# Patient Record
Sex: FEMALE | HISPANIC OR LATINO | Employment: OTHER | URBAN - METROPOLITAN AREA
[De-identification: names, ages, dates, MRNs, and addresses within clinical notes are randomized per-mention and may not be internally consistent; named-entity substitution may affect disease eponyms.]

---

## 2017-12-29 ENCOUNTER — OFFICE VISIT (OUTPATIENT)
Dept: URGENT CARE | Facility: PHYSICIAN GROUP | Age: 62
End: 2017-12-29

## 2017-12-29 VITALS
OXYGEN SATURATION: 96 % | BODY MASS INDEX: 24.69 KG/M2 | SYSTOLIC BLOOD PRESSURE: 102 MMHG | DIASTOLIC BLOOD PRESSURE: 64 MMHG | WEIGHT: 135 LBS | RESPIRATION RATE: 20 BRPM | HEART RATE: 64 BPM | TEMPERATURE: 97.9 F

## 2017-12-29 DIAGNOSIS — R11.2 NON-INTRACTABLE VOMITING WITH NAUSEA, UNSPECIFIED VOMITING TYPE: ICD-10-CM

## 2017-12-29 DIAGNOSIS — J06.9 URI, ACUTE: Primary | ICD-10-CM

## 2017-12-29 DIAGNOSIS — K52.9 GASTROENTERITIS: ICD-10-CM

## 2017-12-29 LAB
APPEARANCE UR: CLEAR
BILIRUB UR STRIP-MCNC: NORMAL MG/DL
COLOR UR AUTO: NORMAL
GLUCOSE UR STRIP.AUTO-MCNC: NORMAL MG/DL
KETONES UR STRIP.AUTO-MCNC: NORMAL MG/DL
LEUKOCYTE ESTERASE UR QL STRIP.AUTO: NORMAL
NITRITE UR QL STRIP.AUTO: NORMAL
PH UR STRIP.AUTO: 5 [PH] (ref 5–8)
PROT UR QL STRIP: NORMAL MG/DL
RBC UR QL AUTO: NORMAL
SP GR UR STRIP.AUTO: 1
UROBILINOGEN UR STRIP-MCNC: NORMAL MG/DL

## 2017-12-29 PROCEDURE — 81002 URINALYSIS NONAUTO W/O SCOPE: CPT | Performed by: PHYSICIAN ASSISTANT

## 2017-12-29 PROCEDURE — 99214 OFFICE O/P EST MOD 30 MIN: CPT | Performed by: PHYSICIAN ASSISTANT

## 2017-12-29 RX ORDER — ONDANSETRON 4 MG/1
4 TABLET, ORALLY DISINTEGRATING ORAL ONCE
Status: COMPLETED | OUTPATIENT
Start: 2017-12-29 | End: 2017-12-29

## 2017-12-29 RX ADMIN — ONDANSETRON 4 MG: 4 TABLET, ORALLY DISINTEGRATING ORAL at 19:51

## 2017-12-30 RX ORDER — ONDANSETRON 4 MG/1
4 TABLET, ORALLY DISINTEGRATING ORAL EVERY 6 HOURS PRN
Qty: 10 TAB | Refills: 1 | Status: SHIPPED | OUTPATIENT
Start: 2017-12-30 | End: 2020-10-08

## 2017-12-30 NOTE — PROGRESS NOTES
Subjective:      Angely Nassar is a 62 y.o. female who presents with Cough (congestion, ear pain, abd pain, back pain,chills  x 5 days)    PMH:  has a past medical history of Active smoker (10/28/2014); Anxiety; Depression; Headache(784.0); HTN (hypertension) (10/7/2013); Hypertension; and Uterine cancer (CMS-HCC) (1994). She also has no past medical history of Breast cancer (CMS-HCC).  MEDS:   Current Outpatient Prescriptions:   •  Acetaminophen (TYLENOL PO), Take 1,000 mg by mouth., Disp: , Rfl:   •  Dextromethorphan Polistirex (ROBITUSSIN 12 HOUR COUGH PO), Take  by mouth., Disp: , Rfl:   •  amoxicillin-clavulanate (AUGMENTIN) 875-125 MG Tab, Take 1 Tab by mouth 2 times a day., Disp: 20 Tab, Rfl: 0  •  fluticasone (FLONASE) 50 MCG/ACT nasal spray, Spray 2 Sprays in nose every day., Disp: 16 g, Rfl: 1  •  clobetasol (TEMOVATE) 0.05 % Cream, Apply to rash on hands twice a day., Disp: 30 g, Rfl: 0  •  hydrocodone-acetaminophen (NORCO) 5-325 MG Tab per tablet, Take 1-2 Tabs by mouth every 6 hours as needed., Disp: 12 Tab, Rfl: 0  •  triamcinolone acetonide (KENALOG) 0.5 % Cream, bid, Disp: 45 g, Rfl: 1  •  clotrimazole-betamethasone (LOTRISONE) 1-0.05 % CREA, APPLY TO THE AFFECTED AREA 2 TIMES DAILY, Disp: 1 Tube, Rfl: 0  •  meloxicam (MOBIC) 15 MG tablet, Take 1 Tab by mouth every day., Disp: 30 Tab, Rfl: 3  •  fluticasone (FLONASE) 50 MCG/ACT nasal spray, Spray 2 Sprays in nose every day., Disp: 16 g, Rfl: 3  •  sumatriptan (IMITREX) 50 MG TABS, Take 1 Tab by mouth Once PRN for Migraine for up to 1 dose., Disp: 10 Tab, Rfl: 3  •  omeprazole (PRILOSEC) 20 MG CPDR, TAKE ONE (1) CAPSULE BY MOUTH ONCE DAILY., Disp: 180 Cap, Rfl: 1  ALLERGIES:   Allergies   Allergen Reactions   • Tylenol W/Codeine [Acetaminophen-Codeine] Anaphylaxis   • Lisinopril      Dryness, watery eyes, cough, difficulty breathing     SURGHX:   Past Surgical History:   Procedure Laterality Date   • CHOLECYSTECTOMY  2001     SOCHX:  reports that  she has been smoking.  She has a 0.90 pack-year smoking history. She has never used smokeless tobacco. She reports that she does not drink alcohol or use drugs.  FH: family history includes Cancer in her mother.          URI    This is a new problem. The current episode started in the past 7 days. The problem has been unchanged. There has been no fever. Associated symptoms include abdominal pain, congestion, coughing, diarrhea, ear pain, headaches, nausea, rhinorrhea, sneezing and vomiting. Treatments tried: otc cough cold medicine. The treatment provided no relief.       Review of Systems   HENT: Positive for congestion, ear pain, rhinorrhea and sneezing.    Respiratory: Positive for cough and sputum production.    Gastrointestinal: Positive for abdominal pain, diarrhea, nausea and vomiting.   Neurological: Positive for headaches.   All other systems reviewed and are negative.         Objective:     /64   Pulse 64   Temp 36.6 °C (97.9 °F)   Resp 20   Wt 61.2 kg (135 lb)   LMP 12/01/2006   SpO2 96%   BMI 24.69 kg/m²      Physical Exam   Constitutional: She is oriented to person, place, and time. She appears well-developed and well-nourished.   HENT:   Head: Normocephalic and atraumatic.   Right Ear: Tympanic membrane normal.   Left Ear: Tympanic membrane normal.   Nose: Mucosal edema and rhinorrhea present.   Mouth/Throat: Posterior oropharyngeal erythema present.   Eyes: Conjunctivae and EOM are normal. Pupils are equal, round, and reactive to light.   Neck: Normal range of motion. Neck supple.   Cardiovascular: Normal rate, regular rhythm and normal heart sounds.    Pulmonary/Chest: Effort normal. No respiratory distress. She has rhonchi.   Abdominal: Soft. Bowel sounds are normal. There is no tenderness.   Musculoskeletal: Normal range of motion.   Neurological: She is alert and oriented to person, place, and time. Gait normal.   Skin: Skin is warm and dry. Capillary refill takes less than 2 seconds.    Psychiatric: She has a normal mood and affect.   Nursing note and vitals reviewed.         UA: neg     Assessment/Plan:     1. URI, acute  ondansetron (ZOFRAN ODT) dispertab 4 mg    POCT Urinalysis   2. Non-intractable vomiting with nausea, unspecified vomiting type  ondansetron (ZOFRAN ODT) dispertab 4 mg    POCT Urinalysis    ondansetron (ZOFRAN ODT) 4 MG TABLET DISPERSIBLE   3. Gastroenteritis  ondansetron (ZOFRAN ODT) dispertab 4 mg    POCT Urinalysis   Discussed that I felt this was viral in nature. Did not see any evidence of a bacterial process. Discussed natural progression and sx care.    PT to follow clear diet for 8-12 hours, then progress to bland diet for 24 hours, then progress to regular diet as tolerated.       Discussed tobacco use, cessation counseling given.  PT is not ready to quit smoking at this time.     PT should follow up with PCP in 1-2 days for re-evaluation if symptoms have not improved.  Discussed red flags and reasons to return to UC or ED.  Pt and/or family verbalized understanding of diagnosis and follow up instructions and was offered informational handout on diagnosis.  PT discharged.

## 2017-12-30 NOTE — PATIENT INSTRUCTIONS
Gastroenteritis viral  (Viral Gastroenteritis)  La gastroenteritis viral también es conocida samina gripe del estómago. Vesna trastorno afecta el estómago y el tubo digestivo. Puede causar diarrea y vómitos repentinos. La enfermedad generalmente dura entre 3 y 8 días. La mayoría de las personas desarrolla litzy respuesta inmunológica. Con el tiempo, esto elimina el virus. Mientras se desarrolla esta respuesta natural, el virus puede afectar en forma importante mendez zackary.   CAUSAS  Muchos virus diferentes pueden causar gastroenteritis, por ejemplo el rotavirus o el norovirus. Estos virus pueden contagiarse al consumir alimentos o agua contaminados. También puede contagiarse al compartir utensilios u otros artículos personales con litzy persona infectada o al tocar litzy superficie contaminada.   SÍNTOMAS  Los síntomas más comunes son diarrea y vómitos. Estos problemas pueden causar litzy pérdida grave de líquidos corporales(deshidratación) y un desequilibrio de sales corporales(electrolitos). Otros síntomas pueden ser:   · Fiebre.  · Dolor de adali.  · Fatiga.  · Dolor abdominal.  DIAGNÓSTICO   El médico podrá hacer el diagnóstico de gastroenteritis viral basándose en los síntomas y el examen físico También pueden tomarle litzy muestra de materia fecal para diagnosticar la presencia de virus u otras infecciones.   TRATAMIENTO  Esta enfermedad generalmente desaparece sin tratamiento. Los tratamientos están dirigidos a la rehidratación. Los casos más graves de gastroenteritis viral implican vómitos tan intensos que no es posible retener líquidos. En estos casos, los líquidos deben administrarse a través de litzy vía intravenosa (IV).   INSTRUCCIONES PARA EL CUIDADO DOMICILIARIO  · Sruthi suficientes líquidos para mantener la orina no o de color amarillo pálido. Sruthi pequeñas cantidades de líquido con frecuencia y aumente la cantidad según la tolerancia.  · Pida instrucciones específicas a mendez médico con respecto a la  rehidratación.  · Evite:  ¨ Alimentos que tengan mucha azúcar.  ¨ Alcohol.  ¨ Gaseosas.  ¨ Tabaco.  ¨ Jugos.  ¨ Bebidas con cafeína.  ¨ Líquidos muy calientes o fríos.  ¨ Alimentos muy grasos.  ¨ Quentin demasiado a la vez.  ¨ Productos lácteos hasta 24 a 48 horas después de que se detenga la diarrea.  · Puede consumir probióticos. Los probióticos son cultivos activos de bacterias beneficiosas. Pueden disminuir la cantidad y el número de deposiciones diarreicas en el adulto. Se encuentran en los yogures con cultivos activos y en los suplementos.  · Lave bert arabella angelica para evitar que se disemine el virus.  · Sólo tome medicamentos de venta jonatan o recetados para calmar el dolor, las molestias o bajar la fiebre según las indicaciones de mendez médico. No administre aspirina a los niños. Los medicamentos antidiarreicos no son recomendables.  · Consulte a mendez médico si puede seguir tomando arabella medicamentos recetados o de venta jonatan.  · Cumpla con todas las visitas de control, según le indique mendez médico.  SOLICITE ATENCIÓN MÉDICA DE INMEDIATO SI:  · No puede retener líquidos.  · No hay emisión de orina laura 6 a 8 horas.  · Le falta el aire.  · Observa pablo en el vómito (se ve samina café molido) o en la materia fecal.  · Siente dolor abdominal que empeora o se concentra en litzy gunner pequeña (se localiza).  · Tiene náuseas o vómitos persistentes.  · Tiene fiebre.  · El paciente es un ava nik de 3 meses y tiene fiebre.  · El paciente es un ava mayor de 3 meses, tiene fiebre y síntomas persistentes.  · El paciente es un ava mayor de 3 meses y tiene fiebre y síntomas que empeoran repentinamente.  · El paciente es un bebé y no tiene lágrimas cuando llora.  ASEGÚRESE QUE:   · Comprende estas instrucciones.  · Controlará mendez enfermedad.  · Solicitará ayuda inmediatamente si no mejora o si empeora.     Esta información no tiene samina fin reemplazar el consejo del médico. Asegúrese de hacerle al médico cualquier pregunta que  "tenga.     Document Released: 12/18/2006 Document Revised: 03/11/2013  Elsevier Interactive Patient Education ©2016 [employer]      Información acerca de la gripe  (Influenza Facts)  La gripe es litzy enfermedad respiratoria contagiosa causada por el virus de la influenza. Puede causar litzy enfermedad moderada a severa. Mientras que la mayoría de las personas sanas se recupera de la gripe sin un tratamiento específico y sin complicaciones, los ancianos, los niños pequeños y los que sufren ciertas enfermedades tienen más riesgo de sufrir complicaciones graves, y aún la muerte.  CAUSAS  · El virus de la gripe se contagia de persona a persona por gotitas que se eliminan al toser o al estornudar.   · Litzy persona también puede infectarse al tocar un objeto o superficie contaminada con virus y luego llevarse la mano a la boca, los ojos o la nariz.   · Los adultos pueden infectar a otras personas desde el día anterior a que se produzcan los síntomas y hasta 7 días después de enfermarse. Por lo tanto, es posible que litzy persona contagie aún antes de saber que está enfermo y que siga contagiando a otras personas mientras esté enfermo.   SÍNTOMAS  · Fiebre (normalmente pauly).   · Dolor de adali.   · Cansancio (puede ser extremo).   · Tos.   · Dolor de garganta.   · Congestión nasal o que gotea.   · Birgit en el cuerpo.   · Puede presentar diarrea o vómitos, especialmente los niños.   · Estos síntomas se conocen samina \"síntomas característicos de la gripe\". Muchas enfermedades distintas, incluso el resfrío común, pueden tener síntomas similares.   DIAGNÓSTICO  · Existen pruebas disponibles que pueden determinar si usted tiene gripe, siempre que se realicen dentro de los primeros 2 ó 3 días después del comienzo de los síntomas.   · Un examen médico y análisis adicionales pueden ser necesarios para identificar si tiene litzy enfermedad que está complicando la gripe.   RIESGOS Y COMPLICACIONES  Algunos de las complicaciones que " ocasiona la gripe son:  · Neumonía bacteriana o neumonía progresiva causada por el virus de la gripe.   · Pérdida de líquidos corporales (deshidratación).   · Empeoramiento de enfermedades crónicas, tales samina la insuficiencia cardíaca, el asma, o la diabetes.   · Problemas en la cavidad nasal e infecciones del oído.   INSTRUCCIONES PARA EL CUIDADO DOMICILIARIO  · Solicite atención médica tempranamente.   · Si está en riesgo de sufrir complicaciones de la gripe, deberá consultar a un profesional de la zackary cuando comiencen los síntomas. Las personas que tienen un alto riesgo de complicaciones son:   · Mayores de 65 años de edad.   · Pacientes con enfermedades crónicas.   · Mujeres embarazadas.   · Niños pequeños.   · El profesional que lo asiste podrá recomendar la utilización de ciertas drogas antivirales para el tratamiento de la gripe.   · Si contrae gripe, es aconsejable que descanse lo suficiente, sonya gran cantidad de líquidos y evite el consumo de alcohol y tabaco.   · También puede sunni medicamentos de venta jonatan que alivien los síntomas de la gripe, si se lo permite el profesional que lo asiste. (Nunca de aspirina a niños o adolescentes que tienen síntomas de la gripe, particularmente fiebre).   PREVENCIÓN  La mejor forma y la más simple de prevenir la gripe es vacunándose cada otoño. Otras medidas que pueden ayudarlo a protegerse contra la gripe son:  · Medicamentos antivirales   · Se nazario aprobado algunos medicamentos antivirales para la prevención de esta enfermedad Son medicamentos prescriptos y deberá consultar al médico antes de utilizarlos.   · Hábitos para litzy buena zackary   · Cúbrase la boca y la nariz con un tisú cuando tosa o estornude, luego bote el tisú luego de usarlo.   · Lávese las angelica frecuentemente con agua y jabón, especialmente luego de toser o estornudar. Si no tiene agua cerca, use un limpiador de angelica con alcohol.   · Evite el contacto personas que estén enfermas.   · Si contrae  la gripe, no vaya al trabajo o escuela. Si está enfermo, no se acerque a otras personas para no contagiarlas.   · Evite tocarse los ojos, la nariz o la boca. Los gérmenes a menudo se contagian de esta forma.   SIGNOS DE EMERGENCIA QUE NECESITAN ATENCIÓN MÉDICA URGENTE EN NIÑOS:  · Respiración rápida o problemas para respirar.   · Color de piel azulado.   · No whitney suficiente cantidad de líquidos.   · No se despierta o no interactúa.   · Está tan irritable que no quiere que se lo cargue.   · Los síntomas mejoran teri luego vuelven con fiebre y la tos empeora.   · Fiebre con erupción cutánea.   SIGNOS DE EMERGENCIA QUE NECESITAN ATENCIÓN MÉDICA URGENTE EN ADULTOS:  · Le falta la respiración o presenta dificultades respiratorias.   · Dolor o presión en el pecho o abdomen.   · Mareos repentinos.   · Confusión.   · Vómitos intensos y persistentes.   SOLICITE ATENCIÓN MÉDICA DE INMEDIATO SI OBSERVA:  Usted o alguien que conoce tiene los síntomas descritos arriba. Cuando llegue al centro de emergencias, comunique en la recepción que mari tener gripe. Es posible que le pidan que utilice litzy máscara y/o ubicarse en un área aislada para evitar que otros se contagien.  ESTÉ SEGURO QUE:  · Comprende las instrucciones para el pauly médica.   · Controlará mendez enfermedad.   · Solicitará atención médica de inmediato según las indicaciones.   Document Released: 03/26/2009 Document Revised: 03/11/2013  [employer]® Patient Information ©2013 [employer].

## 2018-01-03 ASSESSMENT — ENCOUNTER SYMPTOMS
SPUTUM PRODUCTION: 1
VOMITING: 1
ABDOMINAL PAIN: 1
NAUSEA: 1
COUGH: 1
RHINORRHEA: 1
DIARRHEA: 1
HEADACHES: 1

## 2020-01-20 ENCOUNTER — HOSPITAL ENCOUNTER (EMERGENCY)
Facility: MEDICAL CENTER | Age: 65
End: 2020-01-21
Attending: EMERGENCY MEDICINE
Payer: MEDICARE

## 2020-01-20 ENCOUNTER — APPOINTMENT (OUTPATIENT)
Dept: RADIOLOGY | Facility: MEDICAL CENTER | Age: 65
End: 2020-01-20
Attending: EMERGENCY MEDICINE
Payer: MEDICARE

## 2020-01-20 DIAGNOSIS — G43.809 OTHER MIGRAINE WITHOUT STATUS MIGRAINOSUS, NOT INTRACTABLE: ICD-10-CM

## 2020-01-20 DIAGNOSIS — I10 HYPERTENSION, UNSPECIFIED TYPE: ICD-10-CM

## 2020-01-20 PROCEDURE — 99285 EMERGENCY DEPT VISIT HI MDM: CPT

## 2020-01-20 PROCEDURE — 93005 ELECTROCARDIOGRAM TRACING: CPT | Performed by: EMERGENCY MEDICINE

## 2020-01-20 RX ORDER — DIPHENHYDRAMINE HYDROCHLORIDE 50 MG/ML
25 INJECTION INTRAMUSCULAR; INTRAVENOUS ONCE
Status: COMPLETED | OUTPATIENT
Start: 2020-01-21 | End: 2020-01-21

## 2020-01-20 RX ORDER — METOCLOPRAMIDE HYDROCHLORIDE 5 MG/ML
10 INJECTION INTRAMUSCULAR; INTRAVENOUS ONCE
Status: COMPLETED | OUTPATIENT
Start: 2020-01-21 | End: 2020-01-21

## 2020-01-20 ASSESSMENT — ENCOUNTER SYMPTOMS: HEADACHES: 1

## 2020-01-20 ASSESSMENT — LIFESTYLE VARIABLES: DO YOU DRINK ALCOHOL: NO

## 2020-01-21 ENCOUNTER — APPOINTMENT (OUTPATIENT)
Dept: RADIOLOGY | Facility: MEDICAL CENTER | Age: 65
End: 2020-01-21
Attending: EMERGENCY MEDICINE
Payer: MEDICARE

## 2020-01-21 VITALS
DIASTOLIC BLOOD PRESSURE: 68 MMHG | RESPIRATION RATE: 16 BRPM | SYSTOLIC BLOOD PRESSURE: 129 MMHG | HEIGHT: 62 IN | BODY MASS INDEX: 24.87 KG/M2 | TEMPERATURE: 97.5 F | HEART RATE: 59 BPM | WEIGHT: 135.14 LBS | OXYGEN SATURATION: 97 %

## 2020-01-21 LAB
ALBUMIN SERPL BCP-MCNC: 4.3 G/DL (ref 3.2–4.9)
ALBUMIN/GLOB SERPL: 1.8 G/DL
ALP SERPL-CCNC: 76 U/L (ref 30–99)
ALT SERPL-CCNC: 24 U/L (ref 2–50)
ANION GAP SERPL CALC-SCNC: 10 MMOL/L (ref 0–11.9)
AST SERPL-CCNC: 26 U/L (ref 12–45)
BASOPHILS # BLD AUTO: 1.4 % (ref 0–1.8)
BASOPHILS # BLD: 0.12 K/UL (ref 0–0.12)
BILIRUB SERPL-MCNC: 0.4 MG/DL (ref 0.1–1.5)
BUN SERPL-MCNC: 14 MG/DL (ref 8–22)
CALCIUM SERPL-MCNC: 9.2 MG/DL (ref 8.5–10.5)
CHLORIDE SERPL-SCNC: 104 MMOL/L (ref 96–112)
CO2 SERPL-SCNC: 24 MMOL/L (ref 20–33)
CREAT SERPL-MCNC: 0.89 MG/DL (ref 0.5–1.4)
EKG IMPRESSION: NORMAL
EOSINOPHIL # BLD AUTO: 0.26 K/UL (ref 0–0.51)
EOSINOPHIL NFR BLD: 3 % (ref 0–6.9)
ERYTHROCYTE [DISTWIDTH] IN BLOOD BY AUTOMATED COUNT: 41.5 FL (ref 35.9–50)
GLOBULIN SER CALC-MCNC: 2.4 G/DL (ref 1.9–3.5)
GLUCOSE SERPL-MCNC: 154 MG/DL (ref 65–99)
HCT VFR BLD AUTO: 46.4 % (ref 37–47)
HGB BLD-MCNC: 15.7 G/DL (ref 12–16)
IMM GRANULOCYTES # BLD AUTO: 0.03 K/UL (ref 0–0.11)
IMM GRANULOCYTES NFR BLD AUTO: 0.4 % (ref 0–0.9)
LYMPHOCYTES # BLD AUTO: 2.97 K/UL (ref 1–4.8)
LYMPHOCYTES NFR BLD: 34.7 % (ref 22–41)
MCH RBC QN AUTO: 31.1 PG (ref 27–33)
MCHC RBC AUTO-ENTMCNC: 33.8 G/DL (ref 33.6–35)
MCV RBC AUTO: 91.9 FL (ref 81.4–97.8)
MONOCYTES # BLD AUTO: 0.34 K/UL (ref 0–0.85)
MONOCYTES NFR BLD AUTO: 4 % (ref 0–13.4)
NEUTROPHILS # BLD AUTO: 4.85 K/UL (ref 2–7.15)
NEUTROPHILS NFR BLD: 56.5 % (ref 44–72)
NRBC # BLD AUTO: 0 K/UL
NRBC BLD-RTO: 0 /100 WBC
PLATELET # BLD AUTO: 256 K/UL (ref 164–446)
PMV BLD AUTO: 12 FL (ref 9–12.9)
POTASSIUM SERPL-SCNC: 3.4 MMOL/L (ref 3.6–5.5)
PROT SERPL-MCNC: 6.7 G/DL (ref 6–8.2)
RBC # BLD AUTO: 5.05 M/UL (ref 4.2–5.4)
SODIUM SERPL-SCNC: 138 MMOL/L (ref 135–145)
TROPONIN T SERPL-MCNC: 6 NG/L (ref 6–19)
WBC # BLD AUTO: 8.6 K/UL (ref 4.8–10.8)

## 2020-01-21 PROCEDURE — 80053 COMPREHEN METABOLIC PANEL: CPT

## 2020-01-21 PROCEDURE — 96375 TX/PRO/DX INJ NEW DRUG ADDON: CPT

## 2020-01-21 PROCEDURE — 96374 THER/PROPH/DIAG INJ IV PUSH: CPT

## 2020-01-21 PROCEDURE — 85025 COMPLETE CBC W/AUTO DIFF WBC: CPT

## 2020-01-21 PROCEDURE — 71045 X-RAY EXAM CHEST 1 VIEW: CPT

## 2020-01-21 PROCEDURE — 70450 CT HEAD/BRAIN W/O DYE: CPT

## 2020-01-21 PROCEDURE — 84484 ASSAY OF TROPONIN QUANT: CPT

## 2020-01-21 PROCEDURE — 700111 HCHG RX REV CODE 636 W/ 250 OVERRIDE (IP): Performed by: EMERGENCY MEDICINE

## 2020-01-21 RX ORDER — NAPROXEN 500 MG/1
500 TABLET ORAL 2 TIMES DAILY WITH MEALS
Qty: 30 TAB | Refills: 0 | Status: SHIPPED | OUTPATIENT
Start: 2020-01-21 | End: 2020-10-08

## 2020-01-21 RX ADMIN — METOCLOPRAMIDE 10 MG: 5 INJECTION, SOLUTION INTRAMUSCULAR; INTRAVENOUS at 00:46

## 2020-01-21 RX ADMIN — DIPHENHYDRAMINE HYDROCHLORIDE 25 MG: 50 INJECTION INTRAMUSCULAR; INTRAVENOUS at 00:46

## 2020-01-21 ASSESSMENT — ENCOUNTER SYMPTOMS
FEVER: 0
VOMITING: 0
MYALGIAS: 0
NAUSEA: 0
ABDOMINAL PAIN: 0
DIZZINESS: 0

## 2020-01-21 NOTE — ED PROVIDER NOTES
ED Provider Note    Scribed for Dede Jason M.D. by Tesha Gentile. 1/20/2020, 11:40 PM.    Means of arrival: Walk  In  History obtained from: Patient   History limited by: None    CHIEF COMPLAINT  Chief Complaint   Patient presents with   • Headache     back of head    • Hypertension     no hx of same per pt       HPI  Angely Nassar is a 64 y.o. female who presents to the Emergency Department for evaluation of headache for the past 3 weeks. At her daughter's request, the patient came to the ED tonight because her headache has been worsening. She states that the pain is localized at the back of her head, radiating to the front, is moderate and throbbing.  She denies visual disturbances, nausea, vomiting.  Of note patient was noted to be hypertensive in triage, the patient notes that she does not have a history of hypertension and notes that her blood pressure is usually on the lower side. The patient notes that she took a Xanax before coming to the ED today as she was feeling anxious about her headache. The patient additionally endorses allover joint pain that has been going on for many months.  Denies any trauma.  Denies numbness, tingling, or weakness.      REVIEW OF SYSTEMS  Review of Systems   Constitutional: Negative for fever.   Cardiovascular: Negative for chest pain.        Hypertension   Gastrointestinal: Negative for abdominal pain, nausea and vomiting.   Musculoskeletal: Positive for joint pain. Negative for myalgias.   Neurological: Positive for headaches. Negative for dizziness.   All other systems reviewed and are negative.      PAST MEDICAL HISTORY   has a past medical history of Active smoker (10/28/2014), Anxiety, Depression, Headache(784.0), HTN (hypertension) (10/7/2013), Hypertension, and Uterine cancer (HCC) (1994).    SURGICAL HISTORY   has a past surgical history that includes cholecystectomy (2001).    SOCIAL HISTORY  Social History     Tobacco Use   • Smoking status:  Current Every Day Smoker     Packs/day: 0.30     Years: 3.00     Pack years: 0.90   • Smokeless tobacco: Never Used   Substance Use Topics   • Alcohol use: No   • Drug use: No      Social History     Substance and Sexual Activity   Drug Use No       FAMILY HISTORY  Family History   Problem Relation Age of Onset   • Cancer Mother         uterine cancer       CURRENT MEDICATIONS  Current Outpatient Medications:   •  ondansetron (ZOFRAN ODT) 4 MG TABLET DISPERSIBLE, Take 1 Tab by mouth every 6 hours as needed for Nausea., Disp: 10 Tab, Rfl: 1  •  Acetaminophen (TYLENOL PO), Take 1,000 mg by mouth., Disp: , Rfl:   •  Dextromethorphan Polistirex (ROBITUSSIN 12 HOUR COUGH PO), Take  by mouth., Disp: , Rfl:   •  amoxicillin-clavulanate (AUGMENTIN) 875-125 MG Tab, Take 1 Tab by mouth 2 times a day., Disp: 20 Tab, Rfl: 0  •  fluticasone (FLONASE) 50 MCG/ACT nasal spray, Spray 2 Sprays in nose every day., Disp: 16 g, Rfl: 1  •  clobetasol (TEMOVATE) 0.05 % Cream, Apply to rash on hands twice a day., Disp: 30 g, Rfl: 0  •  hydrocodone-acetaminophen (NORCO) 5-325 MG Tab per tablet, Take 1-2 Tabs by mouth every 6 hours as needed., Disp: 12 Tab, Rfl: 0  •  triamcinolone acetonide (KENALOG) 0.5 % Cream, bid, Disp: 45 g, Rfl: 1  •  clotrimazole-betamethasone (LOTRISONE) 1-0.05 % CREA, APPLY TO THE AFFECTED AREA 2 TIMES DAILY, Disp: 1 Tube, Rfl: 0  •  meloxicam (MOBIC) 15 MG tablet, Take 1 Tab by mouth every day., Disp: 30 Tab, Rfl: 3  •  fluticasone (FLONASE) 50 MCG/ACT nasal spray, Spray 2 Sprays in nose every day., Disp: 16 g, Rfl: 3  •  sumatriptan (IMITREX) 50 MG TABS, Take 1 Tab by mouth Once PRN for Migraine for up to 1 dose., Disp: 10 Tab, Rfl: 3  •  omeprazole (PRILOSEC) 20 MG CPDR, TAKE ONE (1) CAPSULE BY MOUTH ONCE DAILY., Disp: 180 Cap, Rfl: 1      ALLERGIES  Allergies   Allergen Reactions   • Tylenol W/Codeine [Acetaminophen-Codeine] Anaphylaxis   • Lisinopril      Dryness, watery eyes, cough, difficulty breathing  "      PHYSICAL EXAM  VITAL SIGNS: BP (!) 180/93   Pulse 81   Temp 36.4 °C (97.5 °F) (Oral)   Resp 17   Ht 1.575 m (5' 2\")   Wt 61.3 kg (135 lb 2.3 oz)   LMP 12/01/2006   SpO2 96%   BMI 24.72 kg/m²   Vitals reviewed by myself.  Physical Exam  Nursing note and vitals reviewed.   Constitutional: Well-developed and well-nourished. No distress.   HENT: Head is normocephalic and atraumatic. Oropharynx is clear and moist without exudate or erythema.   Eyes: Pupils are equal, round, and reactive to light. No horizontal or vertical nystagmus. Conjunctiva are normal.   Cardiovascular: Normal rate and regular rhythm. No murmur heard. Normal radial pulses.  Pulmonary/Chest: Breath sounds normal. No wheezes or rales.   Abdominal: Soft and non-tender. No distention.    Musculoskeletal: Extremities exhibit normal range of motion without edema or tenderness. Patient ambulates with a normal narrow-based steady gait.   Neurological: Awake, alert and oriented to person, place, and time. No focal deficits noted. Cranial nerves II - XII intact. No pronator drift.  No dysmetria on cerebellar testing. Normal speech and language. Normal strength and sensation in bilateral upper and lower extremities.   Skin: Skin is warm and dry. No rash.   Psychiatric: Normal mood and affect. Appropriate for clinical situation.      DIAGNOSTIC STUDIES /  LABS  Labs Reviewed   COMP METABOLIC PANEL - Abnormal; Notable for the following components:       Result Value    Potassium 3.4 (*)     Glucose 154 (*)     All other components within normal limits   CBC WITH DIFFERENTIAL   TROPONIN   ESTIMATED GFR       All labs reviewed by me.    EKG Interpretation:  Interpreted by myself    12 Lead EKG interpreted by me to show:  EKG at 12:18 AM: Normal sinus rhythm, heart rate 67, normal axis,,  with nonspecific intraventricular conduction delay, QTc 448, no acute ST-T segment changes, left ventricular hypertrophy is present, no evidence of acute " arrhythmia or ischemia  My impression of this EKG: Does not indicate ischemia or arrhythmia at this time.    RADIOLOGY  CT-HEAD W/O   Final Result      No acute intracranial abnormality.      DX-CHEST-PORTABLE (1 VIEW)   Final Result      No acute cardiopulmonary abnormality.        The radiologist's interpretation of all radiological studies have been reviewed by me.    REASSESSMENT  11:40 PM - Patient was evaluated at bedside. Ordered EKG, CT-Head, DX-Chest, CBC with differential, CMP, and troponin to evaluate.     2:00 AM - Patient was reevaluated at bedside. Discussed lab and radiology results.     COURSE & MEDICAL DECISION MAKING  Nursing notes, VS, PMSFHx reviewed in chart.    Patient is a 64-year-old female who comes in for evaluation of headache and hypertension.  Differential diagnosis includes essential hypertension, hypertensive urgency, intracranial hemorrhage, electrolyte disturbance, acute kidney injury, acute coronary syndrome.  Diagnostic work-up includes labs, EKG and head CT.    Patient's initial vitals are notable for hypertension, at the time of my assessment patient's hypertension has resolved without intervention.  I had a long discussion with patient about hypertension and the need to keep a blood pressure diary and follow-up with PCP.  Given that her blood pressure has trended down to 130 systolic I would not initiate antihypertensive therapy from the emergency department.  Patient understands and is agreeable to this.  EKG returns and demonstrates no evidence of acute arrhythmia or ischemia.  Labs returned and are unremarkable.  Troponin is within normal limits.  CT of the head demonstrates no acute abnormalities.  Patient's headache was treated with Reglan and Benadryl after which she feels greatly improved.  Therefore patient is reassured.  Patient is then given strict return precautions and discharged in stable condition.    The patient will return for new or worsening symptoms and is  stable at the time of discharge.    The patient is referred to a primary physician for blood pressure management, diabetic screening, and for all other preventative health concerns.    DISPOSITION:  Patient will be discharged home in stable condition.    FOLLOW UP:  Primary Care Physician            OUTPATIENT MEDICATIONS:  Discharge Medication List as of 1/21/2020  2:09 AM      START taking these medications    Details   naproxen (NAPROSYN) 500 MG Tab Take 1 Tab by mouth 2 times a day, with meals., Disp-30 Tab, R-0, Print Rx Paper               FINAL IMPRESSION  1. Other migraine without status migrainosus, not intractable    2. Hypertension, unspecified type         I, Tesha Gentile (Kine), am scribing for, and in the presence of, Dede Jason M.D..    Electronically signed by: Tesha Gentile (Carmencitaibe), 1/20/2020    IDede M.D. personally performed the services described in this documentation, as scribed by Tesha Gentile in my presence, and it is both accurate and complete.    C.     The note accurately reflects work and decisions made by me.  Dede Jason M.D.  1/21/2020  4:00 AM

## 2020-01-21 NOTE — ED TRIAGE NOTES
"Chief Complaint   Patient presents with   • Headache     back of head    • Hypertension     no hx of same per pt      Pt presents to ed for 7/10 headache x3 weeks.  Pt denies n/v, vision changes.  Pt is hypertensive, 180/93 and reports that she does not take medications for this.    Pt is also concerned about ongoing elbow and knee joint pain x3 months.      Pt placed back in lobby.  Informed of triage process and wait times, thanked for patience.  Pt instructed to notify staff of any symptom changes.     BP (!) 180/93   Pulse 81   Temp 36.4 °C (97.5 °F) (Oral)   Resp 17   Ht 1.575 m (5' 2\")   Wt 61.3 kg (135 lb 2.3 oz)   LMP 12/01/2006   SpO2 96%   BMI 24.72 kg/m²    "

## 2020-10-08 ENCOUNTER — HOSPITAL ENCOUNTER (EMERGENCY)
Facility: MEDICAL CENTER | Age: 65
End: 2020-10-09
Attending: EMERGENCY MEDICINE
Payer: MEDICARE

## 2020-10-08 DIAGNOSIS — R51.9 ACUTE NONINTRACTABLE HEADACHE, UNSPECIFIED HEADACHE TYPE: ICD-10-CM

## 2020-10-08 DIAGNOSIS — R11.0 NAUSEA: ICD-10-CM

## 2020-10-08 DIAGNOSIS — Z56.6 STRESS AT WORK: ICD-10-CM

## 2020-10-08 DIAGNOSIS — E86.0 DEHYDRATION: ICD-10-CM

## 2020-10-08 PROCEDURE — 99284 EMERGENCY DEPT VISIT MOD MDM: CPT

## 2020-10-08 PROCEDURE — 93005 ELECTROCARDIOGRAM TRACING: CPT | Performed by: EMERGENCY MEDICINE

## 2020-10-08 PROCEDURE — 93005 ELECTROCARDIOGRAM TRACING: CPT

## 2020-10-08 RX ORDER — SODIUM CHLORIDE 9 MG/ML
1000 INJECTION, SOLUTION INTRAVENOUS ONCE
Status: COMPLETED | OUTPATIENT
Start: 2020-10-09 | End: 2020-10-09

## 2020-10-08 RX ORDER — METOCLOPRAMIDE HYDROCHLORIDE 5 MG/ML
10 INJECTION INTRAMUSCULAR; INTRAVENOUS ONCE
Status: COMPLETED | OUTPATIENT
Start: 2020-10-09 | End: 2020-10-09

## 2020-10-08 RX ORDER — DIPHENHYDRAMINE HCL 25 MG
25 TABLET ORAL ONCE
Status: COMPLETED | OUTPATIENT
Start: 2020-10-09 | End: 2020-10-09

## 2020-10-08 RX ORDER — CHLORTHALIDONE 25 MG/1
12.5 TABLET ORAL DAILY
Status: SHIPPED | COMMUNITY
End: 2021-10-18

## 2020-10-08 RX ORDER — LOSARTAN POTASSIUM 50 MG/1
50 TABLET ORAL DAILY
Status: SHIPPED | COMMUNITY
End: 2022-02-04 | Stop reason: SDUPTHER

## 2020-10-08 RX ORDER — ATORVASTATIN CALCIUM 20 MG/1
20 TABLET, FILM COATED ORAL NIGHTLY
Status: SHIPPED | COMMUNITY
End: 2022-02-04 | Stop reason: SDUPTHER

## 2020-10-08 RX ORDER — DULOXETIN HYDROCHLORIDE 20 MG/1
20 CAPSULE, DELAYED RELEASE ORAL DAILY
Status: SHIPPED | COMMUNITY
End: 2021-10-18

## 2020-10-08 RX ORDER — IBUPROFEN 800 MG/1
800 TABLET ORAL EVERY 8 HOURS PRN
Status: SHIPPED | COMMUNITY
End: 2022-02-04

## 2020-10-08 SDOH — HEALTH STABILITY - MENTAL HEALTH: OTHER PHYSICAL AND MENTAL STRAIN RELATED TO WORK: Z56.6

## 2020-10-08 ASSESSMENT — FIBROSIS 4 INDEX: FIB4 SCORE: 1.35

## 2020-10-09 ENCOUNTER — APPOINTMENT (OUTPATIENT)
Dept: RADIOLOGY | Facility: MEDICAL CENTER | Age: 65
End: 2020-10-09
Attending: EMERGENCY MEDICINE
Payer: MEDICARE

## 2020-10-09 VITALS
HEART RATE: 55 BPM | TEMPERATURE: 97.4 F | OXYGEN SATURATION: 94 % | WEIGHT: 136.47 LBS | RESPIRATION RATE: 10 BRPM | BODY MASS INDEX: 25.11 KG/M2 | HEIGHT: 62 IN | SYSTOLIC BLOOD PRESSURE: 140 MMHG | DIASTOLIC BLOOD PRESSURE: 79 MMHG

## 2020-10-09 LAB
ALBUMIN SERPL BCP-MCNC: 4 G/DL (ref 3.2–4.9)
ALBUMIN/GLOB SERPL: 1.6 G/DL
ALP SERPL-CCNC: 71 U/L (ref 30–99)
ALT SERPL-CCNC: 22 U/L (ref 2–50)
ANION GAP SERPL CALC-SCNC: 12 MMOL/L (ref 7–16)
APPEARANCE UR: CLEAR
APTT PPP: 30.1 SEC (ref 24.7–36)
AST SERPL-CCNC: 18 U/L (ref 12–45)
BASOPHILS # BLD AUTO: 0.9 % (ref 0–1.8)
BASOPHILS # BLD: 0.07 K/UL (ref 0–0.12)
BILIRUB SERPL-MCNC: 0.5 MG/DL (ref 0.1–1.5)
BILIRUB UR QL STRIP.AUTO: NEGATIVE
BUN SERPL-MCNC: 12 MG/DL (ref 8–22)
CALCIUM SERPL-MCNC: 8.9 MG/DL (ref 8.4–10.2)
CHLORIDE SERPL-SCNC: 102 MMOL/L (ref 96–112)
CO2 SERPL-SCNC: 26 MMOL/L (ref 20–33)
COLOR UR: YELLOW
CREAT SERPL-MCNC: 0.72 MG/DL (ref 0.5–1.4)
EKG IMPRESSION: NORMAL
EOSINOPHIL # BLD AUTO: 0.14 K/UL (ref 0–0.51)
EOSINOPHIL NFR BLD: 1.7 % (ref 0–6.9)
EPI CELLS #/AREA URNS HPF: NORMAL /HPF
ERYTHROCYTE [DISTWIDTH] IN BLOOD BY AUTOMATED COUNT: 39.5 FL (ref 35.9–50)
ERYTHROCYTE [SEDIMENTATION RATE] IN BLOOD BY WESTERGREN METHOD: 5 MM/HOUR (ref 0–30)
GLOBULIN SER CALC-MCNC: 2.5 G/DL (ref 1.9–3.5)
GLUCOSE SERPL-MCNC: 120 MG/DL (ref 65–99)
GLUCOSE UR STRIP.AUTO-MCNC: NEGATIVE MG/DL
HCT VFR BLD AUTO: 40.5 % (ref 37–47)
HGB BLD-MCNC: 14.1 G/DL (ref 12–16)
IMM GRANULOCYTES # BLD AUTO: 0.03 K/UL (ref 0–0.11)
IMM GRANULOCYTES NFR BLD AUTO: 0.4 % (ref 0–0.9)
INR PPP: 0.89 (ref 0.87–1.13)
KETONES UR STRIP.AUTO-MCNC: NEGATIVE MG/DL
LEUKOCYTE ESTERASE UR QL STRIP.AUTO: ABNORMAL
LIPASE SERPL-CCNC: 15 U/L (ref 7–58)
LYMPHOCYTES # BLD AUTO: 2.16 K/UL (ref 1–4.8)
LYMPHOCYTES NFR BLD: 26.9 % (ref 22–41)
MAGNESIUM SERPL-MCNC: 2.1 MG/DL (ref 1.5–2.5)
MCH RBC QN AUTO: 31.3 PG (ref 27–33)
MCHC RBC AUTO-ENTMCNC: 34.8 G/DL (ref 33.6–35)
MCV RBC AUTO: 90 FL (ref 81.4–97.8)
MICRO URNS: ABNORMAL
MONOCYTES # BLD AUTO: 0.45 K/UL (ref 0–0.85)
MONOCYTES NFR BLD AUTO: 5.6 % (ref 0–13.4)
MUCOUS THREADS #/AREA URNS HPF: NORMAL /HPF
NEUTROPHILS # BLD AUTO: 5.17 K/UL (ref 2–7.15)
NEUTROPHILS NFR BLD: 64.5 % (ref 44–72)
NITRITE UR QL STRIP.AUTO: NEGATIVE
NRBC # BLD AUTO: 0 K/UL
NRBC BLD-RTO: 0 /100 WBC
PH UR STRIP.AUTO: 6 [PH] (ref 5–8)
PLATELET # BLD AUTO: 210 K/UL (ref 164–446)
PMV BLD AUTO: 11.6 FL (ref 9–12.9)
POTASSIUM SERPL-SCNC: 3.5 MMOL/L (ref 3.6–5.5)
PROT SERPL-MCNC: 6.5 G/DL (ref 6–8.2)
PROT UR QL STRIP: NEGATIVE MG/DL
PROTHROMBIN TIME: 11.8 SEC (ref 12–14.6)
RBC # BLD AUTO: 4.5 M/UL (ref 4.2–5.4)
RBC UR QL AUTO: NEGATIVE
SODIUM SERPL-SCNC: 140 MMOL/L (ref 135–145)
SP GR UR STRIP.AUTO: <=1.005
T4 FREE SERPL-MCNC: 1.39 NG/DL (ref 0.93–1.7)
TROPONIN T SERPL-MCNC: <6 NG/L (ref 6–19)
TSH SERPL DL<=0.005 MIU/L-ACNC: 4 UIU/ML (ref 0.38–5.33)
WBC # BLD AUTO: 8 K/UL (ref 4.8–10.8)
WBC #/AREA URNS HPF: NORMAL /HPF

## 2020-10-09 PROCEDURE — A9270 NON-COVERED ITEM OR SERVICE: HCPCS | Performed by: EMERGENCY MEDICINE

## 2020-10-09 PROCEDURE — 700105 HCHG RX REV CODE 258: Performed by: EMERGENCY MEDICINE

## 2020-10-09 PROCEDURE — 85730 THROMBOPLASTIN TIME PARTIAL: CPT

## 2020-10-09 PROCEDURE — 83690 ASSAY OF LIPASE: CPT

## 2020-10-09 PROCEDURE — 70496 CT ANGIOGRAPHY HEAD: CPT

## 2020-10-09 PROCEDURE — 85652 RBC SED RATE AUTOMATED: CPT

## 2020-10-09 PROCEDURE — 700117 HCHG RX CONTRAST REV CODE 255: Performed by: EMERGENCY MEDICINE

## 2020-10-09 PROCEDURE — 84484 ASSAY OF TROPONIN QUANT: CPT

## 2020-10-09 PROCEDURE — 83735 ASSAY OF MAGNESIUM: CPT

## 2020-10-09 PROCEDURE — 84439 ASSAY OF FREE THYROXINE: CPT

## 2020-10-09 PROCEDURE — 96374 THER/PROPH/DIAG INJ IV PUSH: CPT | Mod: XU

## 2020-10-09 PROCEDURE — 85025 COMPLETE CBC W/AUTO DIFF WBC: CPT

## 2020-10-09 PROCEDURE — 85610 PROTHROMBIN TIME: CPT

## 2020-10-09 PROCEDURE — 700111 HCHG RX REV CODE 636 W/ 250 OVERRIDE (IP): Performed by: EMERGENCY MEDICINE

## 2020-10-09 PROCEDURE — 700102 HCHG RX REV CODE 250 W/ 637 OVERRIDE(OP): Performed by: EMERGENCY MEDICINE

## 2020-10-09 PROCEDURE — 80053 COMPREHEN METABOLIC PANEL: CPT

## 2020-10-09 PROCEDURE — 84443 ASSAY THYROID STIM HORMONE: CPT

## 2020-10-09 PROCEDURE — 81001 URINALYSIS AUTO W/SCOPE: CPT

## 2020-10-09 RX ADMIN — METOCLOPRAMIDE 10 MG: 5 INJECTION, SOLUTION INTRAMUSCULAR; INTRAVENOUS at 00:08

## 2020-10-09 RX ADMIN — SODIUM CHLORIDE 1000 ML: 9 INJECTION, SOLUTION INTRAVENOUS at 00:09

## 2020-10-09 RX ADMIN — DIPHENHYDRAMINE HCL 25 MG: 25 TABLET ORAL at 00:08

## 2020-10-09 RX ADMIN — IOHEXOL 100 ML: 350 INJECTION, SOLUTION INTRAVENOUS at 01:28

## 2020-10-09 ASSESSMENT — LIFESTYLE VARIABLES: DO YOU DRINK ALCOHOL: NO

## 2020-10-09 NOTE — ED PROVIDER NOTES
ED Provider Note    CHIEF COMPLAINT  Chief Complaint   Patient presents with   • Nausea     x 3 weeks   • Generalized Body Aches     x 3 weeks   • Blurred Vision     x 3 weeks   • Back Pain     upper x 3 weeks   • Dizziness     x 3 weeks       HPI    Primary care provider: Not on file  Means of arrival: POV  History obtained from: Patient, daughter  History limited by: Nothing    Angelyai Nassar is a 64 y.o. female who presents with numerous complaints.  Namely a headache for the last 3 weeks.  Hurts most days.  Mostly left-sided sharp sometimes goes to the back of her head.  She did have a fall 2 months ago striking the right side of her head.  She did not seek care at that time.  Did not lose consciousness.  No blood thinners.  Long history of migraine sometimes takes sumatriptan and it has not offered much relief lately.  She also complains of intermittent blurred vision generalized body aches nausea and fatigue.  She also gets lightheadedness sometimes.  The pain is mostly to the left side of her head and sharp, worsened by palpation.  No monocular vision changes she feels just diffuse blurry vision sometimes especially when her pain is worse.  No aggravating factors noted.  This does feel like her migraines but they usually never last this long and usually never this severe.  No thunderclap onset.  No weakness or sensory changes.  No chest or abdominal pain or vomiting today.  No fevers or difficulty breathing or known COVID contact.  She does have increased rest at home due to working too hard lately, no thoughts of self-harm.    REVIEW OF SYSTEMS  Constitutional: Negative for fever or chills.  Positive for malaise.  HENT: Negative for rhinorrhea or sore throat.  Positive for headache.  Eyes: Positive for occasional blurry vision no loss of vision or double vision.  Respiratory: Negative for cough or shortness of breath.    Cardiovascular: Negative for chest pain or palpitations.   Gastrointestinal:  Positive for occasional nausea, no vomiting or abdominal pain.  Genitourinary: Negative for dysuria or flank pain.   Musculoskeletal: Negative for back pain or joint pain.  Positive for occasional body aches.  Skin: Negative for itching or rash.   Neurological: Negative for sensory or motor changes.   Psychiatric/Behavioral: Positive for increased stress no suicidal thoughts.  See HPI for further details. All other systems are negative.     PAST MEDICAL HISTORY   has a past medical history of Active smoker (10/28/2014), Anxiety, Depression, Headache(784.0), HTN (hypertension) (10/7/2013), Hypertension, and Uterine cancer (HCC) (1994).    PAST FAMILY HISTORY  Family History   Problem Relation Age of Onset   • Cancer Mother         uterine cancer       SOCIAL HISTORY  Social History     Tobacco Use   • Smoking status: Current Every Day Smoker     Packs/day: 0.30     Years: 3.00     Pack years: 0.90   • Smokeless tobacco: Never Used   Substance and Sexual Activity   • Alcohol use: No   • Drug use: No   • Sexual activity: Never       SURGICAL HISTORY   has a past surgical history that includes cholecystectomy (2001).    CURRENT MEDICATIONS  Home Medications     Reviewed by Lorri Bello R.N. (Registered Nurse) on 10/08/20 at 2235  Med List Status: Partial   Medication Last Dose Status   Acetaminophen (TYLENOL PO) not taking Active   atorvastatin (LIPITOR) 20 MG Tab 10/7/2020 Active   chlorthalidone (HYGROTON) 25 MG Tab 10/7/2020 Active   DULoxetine (CYMBALTA) 20 MG Cap DR Particles past week Active   hydrocodone-acetaminophen (NORCO) 5-325 MG Tab per tablet not taking Active   ibuprofen (MOTRIN) 800 MG Tab 10/7/2020 Active   losartan (COZAAR) 50 MG Tab 10/7/2020 Active   sumatriptan (IMITREX) 50 MG TABS 10/8/2020 Active                ALLERGIES  Allergies   Allergen Reactions   • Tylenol W/Codeine [Acetaminophen-Codeine] Anaphylaxis   • Lisinopril      Dryness, watery eyes, cough, difficulty breathing       PHYSICAL  "EXAM  VITAL SIGNS: /79   Pulse (!) 55   Temp 36.3 °C (97.4 °F) (Temporal)   Resp (!) 10   Ht 1.575 m (5' 2\")   Wt 61.9 kg (136 lb 7.4 oz)   LMP 12/01/2006   SpO2 94%   Breastfeeding No   BMI 24.96 kg/m²    Pulse ox interpretation: On room air, I interpret this pulse ox as normal.  Constitutional: Chronically ill-appearing sitting up in mild distress.  HEENT: Normocephalic, atraumatic. Posterior pharynx clear, mucous membranes dry.  Eyes:  EOMI. Normal sclerae.  Visual fields full, PERRLA 3-2.  Neck: Supple, nontender.  Chest/Pulmonary: Clear to ausculation bilaterally, no wheezes or rhonchi.  Cardiovascular: Regular rate and rhythm, no murmur.   Abdomen: Soft, nontender; no rebound, guarding, or masses.  Back: No CVA or midline tenderness.   Musculoskeletal: No deformity or edema.  Neuro: Clear speech, normal coordination, cranial nerves II-XII grossly intact, no focal asymmetry or sensory deficits.   Psych: Anxious appearing but pleasant and cooperative.  Skin: No rashes, warm and dry.      DIAGNOSTIC STUDIES / PROCEDURES    LABS & EKG  Results for orders placed or performed during the hospital encounter of 10/08/20   CBC WITH DIFFERENTIAL   Result Value Ref Range    WBC 8.0 4.8 - 10.8 K/uL    RBC 4.50 4.20 - 5.40 M/uL    Hemoglobin 14.1 12.0 - 16.0 g/dL    Hematocrit 40.5 37.0 - 47.0 %    MCV 90.0 81.4 - 97.8 fL    MCH 31.3 27.0 - 33.0 pg    MCHC 34.8 33.6 - 35.0 g/dL    RDW 39.5 35.9 - 50.0 fL    Platelet Count 210 164 - 446 K/uL    MPV 11.6 9.0 - 12.9 fL    Neutrophils-Polys 64.50 44.00 - 72.00 %    Lymphocytes 26.90 22.00 - 41.00 %    Monocytes 5.60 0.00 - 13.40 %    Eosinophils 1.70 0.00 - 6.90 %    Basophils 0.90 0.00 - 1.80 %    Immature Granulocytes 0.40 0.00 - 0.90 %    Nucleated RBC 0.00 /100 WBC    Neutrophils (Absolute) 5.17 2.00 - 7.15 K/uL    Lymphs (Absolute) 2.16 1.00 - 4.80 K/uL    Monos (Absolute) 0.45 0.00 - 0.85 K/uL    Eos (Absolute) 0.14 0.00 - 0.51 K/uL    Baso (Absolute) 0.07 " 0.00 - 0.12 K/uL    Immature Granulocytes (abs) 0.03 0.00 - 0.11 K/uL    NRBC (Absolute) 0.00 K/uL   CMP   Result Value Ref Range    Sodium 140 135 - 145 mmol/L    Potassium 3.5 (L) 3.6 - 5.5 mmol/L    Chloride 102 96 - 112 mmol/L    Co2 26 20 - 33 mmol/L    Anion Gap 12.0 7.0 - 16.0    Glucose 120 (H) 65 - 99 mg/dL    Bun 12 8 - 22 mg/dL    Creatinine 0.72 0.50 - 1.40 mg/dL    Calcium 8.9 8.4 - 10.2 mg/dL    AST(SGOT) 18 12 - 45 U/L    ALT(SGPT) 22 2 - 50 U/L    Alkaline Phosphatase 71 30 - 99 U/L    Total Bilirubin 0.5 0.1 - 1.5 mg/dL    Albumin 4.0 3.2 - 4.9 g/dL    Total Protein 6.5 6.0 - 8.2 g/dL    Globulin 2.5 1.9 - 3.5 g/dL    A-G Ratio 1.6 g/dL   Sed Rate   Result Value Ref Range    Sed Rate Westergren 5 0 - 30 mm/hour   MAGNESIUM   Result Value Ref Range    Magnesium 2.1 1.5 - 2.5 mg/dL   TSH   Result Value Ref Range    TSH 4.000 0.380 - 5.330 uIU/mL   TROPONIN   Result Value Ref Range    Troponin T <6 6 - 19 ng/L   LIPASE   Result Value Ref Range    Lipase 15 7 - 58 U/L   PT/INR   Result Value Ref Range    PT 11.8 (L) 12.0 - 14.6 sec    INR 0.89 0.87 - 1.13   PTT   Result Value Ref Range    APTT 30.1 24.7 - 36.0 sec   FREE THYROXINE   Result Value Ref Range    Free T-4 1.39 0.93 - 1.70 ng/dL   ESTIMATED GFR   Result Value Ref Range    GFR If African American >60 >60 mL/min/1.73 m 2    GFR If Non African American >60 >60 mL/min/1.73 m 2   URINALYSIS,CULTURE IF INDICATED    Specimen: Urine   Result Value Ref Range    Color Yellow     Character Clear     Specific Gravity <=1.005 <1.035    Ph 6.0 5.0 - 8.0    Glucose Negative Negative mg/dL    Ketones Negative Negative mg/dL    Protein Negative Negative mg/dL    Bilirubin Negative Negative    Nitrite Negative Negative    Leukocyte Esterase Small (A) Negative    Occult Blood Negative Negative    Micro Urine Req Microscopic    URINE MICROSCOPIC (W/UA)   Result Value Ref Range    WBC 0-2 /hpf    Epithelial Cells Few Few /hpf    Mucous Threads Few /hpf   EKG    Result Value Ref Range    Report       [facility] Emergency Dept.    Test Date:  2020-10-08  Pt Name:    ANNELISE JACKSON             Department: EDSM  MRN:        [id number]                      Room:  Gender:     Female                       Technician: HRR  :        [id number]                   Requested By:ER TRIAGE PROTOCOL  Order #:    [id number]                    Reading MD: Umesh Richey MD    Measurements  Intervals                                Axis  Rate:       58                           P:          40  OH:         192                          QRS:        34  QRSD:       91                           T:          31  QT:         450  QTc:        443    Interpretive Statements  Sinus bradycardia  Compared to ECG 2020 00:18:39  Sinus rhythm no longer present  Intraventricular conduction delay no longer present  ST (T wave) deviation no longer present  Possible ischemia no longer present  Stable EKG no STEMI or dysrhythmia  Electronically Signed On 10-9-2020 7:35:33 PDT by Umesh lott MD         RADIOLOGY  CT-CTA HEAD WITH & W/O-POST PROCESS   Final Result      1.  CT angiogram of the [place] within normal limits. Dominant right vertebral artery, with diminutive left vertebral artery, which is difficult to visualize above the foramen magnum.   2.  Unremarkable precontrast scan.          COURSE & MEDICAL DECISION MAKING    This is a 64 y.o. female who presents with numerous complaints but primarily headache for 3 weeks.    Differential Diagnosis includes but is not limited to:  Temporal arteritis, subdural hematoma, tension, migraine, aneurysm, tumor, electrolyte abnormality    ED Course:  This is a 64-year-old female with headache on and off for 3 weeks, occasional blurry vision, also just general malaise.  She has had decreased oral intake today and looks dehydrated I will give her a crystalloid fluid bolus because she needs to be kept n.p.o. until  surgical process is ruled out, as well as Reglan and Benadryl and perform a thorough work-up given concerning differentials and the patient's age.    Thankfully, the patient's work-up is very reassuring, she has normal labs.  No fevers white count normal doubt infection.  No anemia.  Electrolytes are stable without acidosis.  Thyroid studies reassuring.  Coagulation studies are normal.  Sed rate normal doubt temporal arteritis.  CTA of the head nothing acute doubt clot or bleed or tumor.  EKG reassuring stable electrolytes doubt dysrhythmia.    On recheck after parenteral meds and fluids the patient is feeling much better.  Stable neuro exam.  Normal vital signs.  Discussed at length reassuring work-up today with patient and daughter, doubt life-threatening cause, possible migraine or tension.  Recommend she rest tomorrow, drink plenty of fluids, follow-up with PCP as soon as possible, return to the ER for any new or worsening pain fevers vomiting or any other concerns.  She previously went to the [facility] clinic is hoping to change to our system, I personally left a voicemail with schedulers to try to arrange urgent follow-up as soon as possible with her primary care clinics for recheck and routine care.  Patient and daughter understand and agree with plan of care.    Medications   metoclopramide (REGLAN) injection 10 mg (10 mg Intravenous Given 10/9/20 0008)   diphenhydrAMINE (BENADRYL) tablet/capsule 25 mg (25 mg Oral Given 10/9/20 0008)   NS infusion 1,000 mL (0 mL Intravenous Stopped 10/9/20 0230)   iohexol (OMNIPAQUE) 350 mg/mL (100 mL Intravenous Given 10/9/20 0128)       FINAL IMPRESSION  1. Acute nonintractable headache, unspecified headache type    2. Dehydration    3. Nausea    4. Stress at work        PRESCRIPTIONS  Discharge Medication List as of 10/9/2020  3:08 AM          FOLLOW UP  [facility], Emergency Dept  [address]  [place] [id number] [phone]  Today  If you  have ANY new or worse symptoms!    Our primary care clinics  Schedulers should contact you in the next few days to arrange a new primary care provider for recheck and routine health care.  Schedule an appointment as soon as possible for a visit in 2 days      [facility], Emergency Dept  [address]  [place] [id number] [phone]  Today  If you have ANY new or worse symptoms!      -DISCHARGE-       Results, exam findings, clinical impression, presumed diagnosis, treatment options, and strict return precautions were discussed with the patient and daughter, and they verbalized understanding, agreed with, and appreciated the plan of care.    Pertinent Labs & Imaging studies reviewed and verified by myself, as well as nursing notes and the patient's past medical, family, and social histories (See chart for details).    The patient is referred to a primary physician for blood pressure management, diabetic screening, and for all other preventative health concerns.     Portions of this record were made with voice recognition software.  Despite my review, spelling/grammar/context errors may still remain.  Interpretation of this chart should be taken in this context.    Electronically signed by Umesh Richey M.D. on 10/9/2020 at 7:37 AM.

## 2020-10-09 NOTE — DISCHARGE INSTRUCTIONS
You were seen and evaluated in the Emergency Department at [facility] for:     Severe headache and nausea    You had the following tests and studies:    Thankfully, your work-up today is very reassuring.  We do not see anything dangerous causing your symptoms.  Hopefully this is something like a migraine or tension headache.    You received the following medications:    Reglan and diphenhydramine and IV fluids.    You received the following prescriptions:    Continue home medications.  ----------------------------    Please make sure to follow up with:    We will contact her schedulers to get your new primary care provider for recheck and routine care, but if you have any new or worsening pain or headaches or vision changes or weakness or fevers or vomiting or any other concerns return to the ER immediately.    Good luck, we hope you get better soon!  ----------------------------    We always encourage patients to return IMMEDIATELY if they have:  Increased or changing pain, passing out, fevers over 100.4 (taken in your mouth or rectally) for more than 2 days, redness or swelling of skin or tissues, feeling like your heart is beating fast, chest pain that is new or worsening, trouble breathing, feeling like your throat is closing up and can not breath, inability to walk, weakness of any part of your body, new dizziness, severe bleeding that won't stop from any part of your body, if you can't eat or drink, or if you have any other concerns.   If you feel worse, please know that you can always return with any questions, concerns, worse symptoms, or you are feeling unsafe. We certainly cannot say for sure that we have ruled out every illness or dangerous disease, but we feel that at this specific time, your exam, tests, and vital signs like heart rate and blood pressure are safe for discharge.

## 2020-10-09 NOTE — ED NOTES
Pt to er with 3 week hx of numerous c/o-seen by pcp on Tuesday with lab work ordered and completed, though not noted in epic, pt in no apparent distress, though c/o dizziness, h/a, nausea, blurred vision, body aches, upper back pain. ekg in triage

## 2021-03-03 DIAGNOSIS — Z23 NEED FOR VACCINATION: ICD-10-CM

## 2021-10-18 ENCOUNTER — OFFICE VISIT (OUTPATIENT)
Dept: INTERNAL MEDICINE | Facility: OTHER | Age: 66
End: 2021-10-18
Payer: MEDICARE

## 2021-10-18 VITALS
TEMPERATURE: 96.8 F | OXYGEN SATURATION: 96 % | WEIGHT: 127.2 LBS | HEART RATE: 74 BPM | SYSTOLIC BLOOD PRESSURE: 102 MMHG | BODY MASS INDEX: 23.41 KG/M2 | DIASTOLIC BLOOD PRESSURE: 65 MMHG | HEIGHT: 62 IN

## 2021-10-18 DIAGNOSIS — F41.9 ANXIETY: ICD-10-CM

## 2021-10-18 DIAGNOSIS — I10 PRIMARY HYPERTENSION: ICD-10-CM

## 2021-10-18 DIAGNOSIS — R10.13 EPIGASTRIC PAIN: ICD-10-CM

## 2021-10-18 DIAGNOSIS — F17.200 CURRENT SMOKER: ICD-10-CM

## 2021-10-18 PROBLEM — R10.9 ABDOMINAL PAIN: Status: ACTIVE | Noted: 2021-10-18

## 2021-10-18 PROCEDURE — 99214 OFFICE O/P EST MOD 30 MIN: CPT | Mod: GC | Performed by: STUDENT IN AN ORGANIZED HEALTH CARE EDUCATION/TRAINING PROGRAM

## 2021-10-18 RX ORDER — OMEPRAZOLE 20 MG/1
20 CAPSULE, DELAYED RELEASE ORAL DAILY
Qty: 90 CAPSULE | Refills: 2 | Status: SHIPPED | OUTPATIENT
Start: 2021-10-18 | End: 2022-08-19 | Stop reason: SDUPTHER

## 2021-10-18 RX ORDER — AMITRIPTYLINE HYDROCHLORIDE 25 MG/1
25 TABLET, FILM COATED ORAL NIGHTLY PRN
Qty: 90 TABLET | Refills: 2 | Status: SHIPPED | OUTPATIENT
Start: 2021-10-18 | End: 2022-02-04

## 2021-10-18 RX ORDER — HYDROXYZINE HYDROCHLORIDE 25 MG/1
25 TABLET, FILM COATED ORAL 3 TIMES DAILY PRN
Qty: 60 TABLET | Refills: 1 | Status: SHIPPED | OUTPATIENT
Start: 2021-10-18 | End: 2022-02-04

## 2021-10-18 RX ORDER — NAPROXEN 500 MG/1
TABLET ORAL
COMMUNITY
Start: 2021-08-06 | End: 2022-02-04 | Stop reason: SDUPTHER

## 2021-10-18 ASSESSMENT — FIBROSIS 4 INDEX: FIB4 SCORE: 1.21

## 2021-10-18 ASSESSMENT — PATIENT HEALTH QUESTIONNAIRE - PHQ9: CLINICAL INTERPRETATION OF PHQ2 SCORE: 0

## 2021-10-18 NOTE — ASSESSMENT & PLAN NOTE
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 Duloxetine caused psychosis, sertraline caused GI upset. Had tried xanax and amytriptyline in past, both of which did help in past.  -Amytriptyline 25mg PO qHS/PRN  -Discussed mindfulness techniques  -Directed to counseling services  -Follow up 1 month for medication titration/changes

## 2021-10-18 NOTE — ASSESSMENT & PLAN NOTE
Long standing cigarette use. Has been smoking 2-3 cigarettes/day. Continues to express interest in cessation.  -NRT patches 7mg qD  -Follow up 1 month

## 2021-10-18 NOTE — PROGRESS NOTES
CC:   Chief Complaint   Patient presents with   • Anxiety     follow up   • Shortness of Breath     feels due to anxiety                                                                                                                                           HPI:   Angely presents today with the following.    Increased anxiety and stress. 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  Last visit was going to get NRT patches and duloxetine, but was unable to .  Does cupping on painful areas of back/ribs, which she feels improves the pain. Rib pain also feels like it has a component of gas pain, improved with baking soda ingestion.    She is a chronic smoker, has quit off/on for years. She is currently smoking 2-3 cigarettes daily, her maximum lifetime cigarette use was 5 cigarettes/daily (estimated 13 pk/yr).    1. Current smoker    2. Primary hypertension    3. Anxiety    4. Epigastric pain      Patient Active Problem List    Diagnosis Date Noted   • Abdominal pain 10/18/2021   • Migraine 10/28/2014   • Current smoker 10/28/2014   • HTN (hypertension) 10/07/2013   • OA (osteoarthritis) 05/30/2013   • Dyspepsia 05/30/2013   • Depression 05/30/2013   • Anxiety 05/30/2013       Current Outpatient Medications   Medication Sig Dispense Refill   • naproxen (NAPROSYN) 500 MG Tab      • amitriptyline (ELAVIL) 25 MG Tab Take 1 Tablet by mouth at bedtime as needed for Other (moderate pain). 90 Tablet 2   • nicotine (NICODERM) 7 MG/24HR PATCH 24 HR Place 1 Patch on the skin every 24 hours. 30 Patch 3   • hydrOXYzine HCl (ATARAX) 25 MG Tab Take 1 Tablet by mouth 3 times a day as needed for Anxiety. 60 Tablet 1   • omeprazole (PRILOSEC) 20 MG delayed-release capsule Take 1 Capsule by mouth every day. 90 Capsule 2   • losartan (COZAAR) 50 MG Tab Take 100 mg by mouth every day.     • atorvastatin (LIPITOR) 20 MG Tab Take 20 mg by mouth every evening.     •  "Acetaminophen (TYLENOL PO) Take 1,000 mg by mouth.     • sumatriptan (IMITREX) 50 MG TABS Take 1 Tab by mouth Once PRN for Migraine for up to 1 dose. 10 Tab 3   • ibuprofen (MOTRIN) 800 MG Tab Take 800 mg by mouth every 8 hours as needed.     • hydrocodone-acetaminophen (NORCO) 5-325 MG Tab per tablet Take 1-2 Tabs by mouth every 6 hours as needed. (Patient not taking: Reported on 10/18/2021) 12 Tab 0     No current facility-administered medications for this visit.         Allergies as of 10/18/2021 - Reviewed 10/18/2021   Allergen Reaction Noted   • Acetaminophen-codeine Anaphylaxis 09/28/2010   • Tylenol w/codeine [acetaminophen-codeine] Anaphylaxis 09/28/2010   • Lisinopril  03/09/2012          /65 (BP Location: Right arm, Patient Position: Sitting, BP Cuff Size: Adult)   Pulse 74   Temp 36 °C (96.8 °F) (Temporal)   Ht 1.562 m (5' 1.5\")   Wt 57.7 kg (127 lb 3.2 oz)   LMP 12/01/2006   SpO2 96%   BMI 23.65 kg/m²     ROS     Physical Exam:  Gen:  Alert and oriented, No apparent distress.  Neuro:  CN II-XII intact, no focal deficits  Lungs:  CTAB  CV:  RRR no m/g/r  Abd:  Soft, nondistended; epigastric pulsatile lump  Skin:  No acute rash/lesions  Ext:  Shoulder/elbow flexion/extension 5/5 bilaterally and symmetric; ambulates independently      Assessment and Plan.   Angely Nassar is a 65 y.o. 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 Duloxetine caused psychosis, sertraline caused GI upset. Had tried xanax and amytriptyline in past, both of which did help in past.  -Amytriptyline 25mg PO qHS/PRN  -Discussed mindfulness techniques  -Directed to counseling services  -Follow up 1 month for medication titration/changes    HTN (hypertension)  Past hx of HTN, today in clinic she is hypotensive to 90/58. No lightheaded/dizziness. Repeat BP normalised, low. Counseled to take 1/2 tab of chlorthalidone.  -Losartan 50mg PO " qD  -Chlorthalidone 12.5mg PO qD    Current smoker  Long standing cigarette use. Has been smoking 2-3 cigarettes/day. Continues to express interest in cessation.  -NRT patches 7mg qD  -Follow up 1 month    Abdominal pain  Chronic abd pain, epigastric, without radiation. Constant, dully/achy. Has pulsatile enlarged area without bruit. Possible AAA (though risk factors inconsistent), GERD/gastritis, pancreatitis.  -US abd (AAA)  -omeprazole 20mg PO qD

## 2021-10-18 NOTE — ASSESSMENT & PLAN NOTE
Chronic abd pain, epigastric, without radiation. Constant, dully/achy. Has pulsatile enlarged area without bruit. Possible AAA (though risk factors inconsistent), GERD/gastritis, pancreatitis.  -US abd (AAA)  -omeprazole 20mg PO qD

## 2021-10-18 NOTE — ASSESSMENT & PLAN NOTE
Past hx of HTN, today in clinic she is hypotensive to 90/58. No lightheaded/dizziness. Repeat BP normalised, low. Counseled to take 1/2 tab of chlorthalidone.  -Losartan 50mg PO qD  -Chlorthalidone 12.5mg PO qD

## 2021-10-19 ENCOUNTER — HOSPITAL ENCOUNTER (OUTPATIENT)
Dept: LAB | Facility: MEDICAL CENTER | Age: 66
End: 2021-10-19
Attending: STUDENT IN AN ORGANIZED HEALTH CARE EDUCATION/TRAINING PROGRAM
Payer: MEDICARE

## 2021-10-19 DIAGNOSIS — R10.13 EPIGASTRIC PAIN: ICD-10-CM

## 2021-10-19 LAB — LIPASE SERPL-CCNC: 29 U/L (ref 11–82)

## 2021-10-19 PROCEDURE — 83690 ASSAY OF LIPASE: CPT

## 2021-10-19 PROCEDURE — 36415 COLL VENOUS BLD VENIPUNCTURE: CPT

## 2022-02-04 ENCOUNTER — OFFICE VISIT (OUTPATIENT)
Dept: INTERNAL MEDICINE | Facility: OTHER | Age: 67
End: 2022-02-04
Payer: MEDICARE

## 2022-02-04 ENCOUNTER — TELEPHONE (OUTPATIENT)
Dept: INTERNAL MEDICINE | Facility: OTHER | Age: 67
End: 2022-02-04

## 2022-02-04 VITALS
SYSTOLIC BLOOD PRESSURE: 98 MMHG | OXYGEN SATURATION: 93 % | TEMPERATURE: 96.8 F | BODY MASS INDEX: 21.99 KG/M2 | HEIGHT: 65 IN | DIASTOLIC BLOOD PRESSURE: 55 MMHG | HEART RATE: 83 BPM | WEIGHT: 132 LBS

## 2022-02-04 DIAGNOSIS — I10 PRIMARY HYPERTENSION: ICD-10-CM

## 2022-02-04 DIAGNOSIS — F33.41 RECURRENT MAJOR DEPRESSIVE DISORDER, IN PARTIAL REMISSION (HCC): ICD-10-CM

## 2022-02-04 DIAGNOSIS — F17.200 CURRENT SMOKER: ICD-10-CM

## 2022-02-04 DIAGNOSIS — Z79.899 HIGH RISK MEDICATION USE: ICD-10-CM

## 2022-02-04 DIAGNOSIS — G43.909 MIGRAINE WITHOUT STATUS MIGRAINOSUS, NOT INTRACTABLE, UNSPECIFIED MIGRAINE TYPE: ICD-10-CM

## 2022-02-04 DIAGNOSIS — R10.13 DYSPEPSIA: ICD-10-CM

## 2022-02-04 PROBLEM — R06.02 SHORTNESS OF BREATH: Status: RESOLVED | Noted: 2021-04-02 | Resolved: 2022-02-04

## 2022-02-04 PROBLEM — R53.81 MALAISE AND FATIGUE: Status: ACTIVE | Noted: 2020-10-06

## 2022-02-04 PROBLEM — G89.29 CHRONIC BACK PAIN: Status: ACTIVE | Noted: 2020-10-06

## 2022-02-04 PROBLEM — K08.89 TOOTHACHE: Status: ACTIVE | Noted: 2021-06-10

## 2022-02-04 PROBLEM — M54.9 CHRONIC BACK PAIN: Status: ACTIVE | Noted: 2020-10-06

## 2022-02-04 PROBLEM — K08.89 TOOTHACHE: Status: RESOLVED | Noted: 2021-06-10 | Resolved: 2022-02-04

## 2022-02-04 PROBLEM — R06.02 SHORTNESS OF BREATH: Status: ACTIVE | Noted: 2021-04-02

## 2022-02-04 PROBLEM — L30.9 DERMATITIS: Status: ACTIVE | Noted: 2021-02-26

## 2022-02-04 PROBLEM — R73.03 PREDIABETES: Status: ACTIVE | Noted: 2020-02-27

## 2022-02-04 PROBLEM — G25.81 RESTLESS LEGS: Status: ACTIVE | Noted: 2020-01-24

## 2022-02-04 PROBLEM — E78.5 DYSLIPIDEMIA: Status: ACTIVE | Noted: 2020-02-27

## 2022-02-04 PROBLEM — R53.83 MALAISE AND FATIGUE: Status: ACTIVE | Noted: 2020-10-06

## 2022-02-04 PROCEDURE — 99213 OFFICE O/P EST LOW 20 MIN: CPT | Mod: GE | Performed by: STUDENT IN AN ORGANIZED HEALTH CARE EDUCATION/TRAINING PROGRAM

## 2022-02-04 RX ORDER — CHLORTHALIDONE 25 MG/1
12.5 TABLET ORAL DAILY
COMMUNITY
End: 2022-02-04

## 2022-02-04 RX ORDER — SUMATRIPTAN 50 MG/1
50 TABLET, FILM COATED ORAL
Qty: 10 TABLET | Refills: 3 | Status: SHIPPED | OUTPATIENT
Start: 2022-02-04 | End: 2022-03-08

## 2022-02-04 RX ORDER — AMITRIPTYLINE HYDROCHLORIDE 10 MG/1
10 TABLET, FILM COATED ORAL NIGHTLY
Qty: 90 TABLET | Refills: 0 | Status: SHIPPED | OUTPATIENT
Start: 2022-02-04 | End: 2022-08-19 | Stop reason: SDUPTHER

## 2022-02-04 RX ORDER — NAPROXEN 500 MG/1
500 TABLET ORAL 2 TIMES DAILY PRN
Qty: 30 TABLET | Refills: 0 | Status: SHIPPED | OUTPATIENT
Start: 2022-02-04 | End: 2022-03-08

## 2022-02-04 RX ORDER — CHLORTHALIDONE 25 MG/1
12.5 TABLET ORAL DAILY
Qty: 45 TABLET | Refills: 0 | Status: SHIPPED | OUTPATIENT
Start: 2022-02-04 | End: 2022-08-19 | Stop reason: SDUPTHER

## 2022-02-04 RX ORDER — BUPROPION HYDROCHLORIDE 75 MG/1
75 TABLET ORAL 2 TIMES DAILY
Qty: 90 TABLET | Refills: 0 | Status: SHIPPED | OUTPATIENT
Start: 2022-02-04 | End: 2022-02-04 | Stop reason: CLARIF

## 2022-02-04 RX ORDER — LOSARTAN POTASSIUM 50 MG/1
50 TABLET ORAL DAILY
Qty: 90 TABLET | Refills: 0 | Status: SHIPPED | OUTPATIENT
Start: 2022-02-04 | End: 2022-08-19 | Stop reason: SDUPTHER

## 2022-02-04 RX ORDER — ATORVASTATIN CALCIUM 20 MG/1
20 TABLET, FILM COATED ORAL NIGHTLY
Qty: 90 TABLET | Refills: 0 | Status: SHIPPED | OUTPATIENT
Start: 2022-02-04 | End: 2022-08-19 | Stop reason: SDUPTHER

## 2022-02-04 ASSESSMENT — FIBROSIS 4 INDEX: FIB4 SCORE: 1.22

## 2022-02-04 ASSESSMENT — PATIENT HEALTH QUESTIONNAIRE - PHQ9
5. POOR APPETITE OR OVEREATING: 0 - NOT AT ALL
CLINICAL INTERPRETATION OF PHQ2 SCORE: 2
SUM OF ALL RESPONSES TO PHQ QUESTIONS 1-9: 5

## 2022-02-04 NOTE — PATIENT INSTRUCTIONS
Please cut your sertraline in half to 25mg per day for the next 2 weeks.  Start buproprion 75mg only once a day for the next 2 weeks  After 2 weeks,  Stop sertraline completely.  Start taking bupropion 75mg twice a day.    Please take chlorthalidone 12.5mg daily

## 2022-02-04 NOTE — PROGRESS NOTES
2/4/2022 10:11 AM    Chief Complaint   Patient presents with   • Follow-Up   • Hypertension       History of Present Illness:  66 y.o. female established patient who previously saw Dr. Lassiter presents today for a followup visit.     She states her depression has not been adequately controlled with sertraline 50 mg, she was also on amitriptyline 10 mg unclear for migraine/pain or depression?  Many years ago and also taking bupropion with better results.  She is not currently taking any pain medication except for naproxen 3/week  Sumatriptan does help her migraines only uses 2-3 times monthly  Taking chlorthalidone 25 mg daily and losartan 50 mg daily her blood pressure has been 90s over 50s, denies any lightheadedness or dizziness except first thing in the morning when waking up and states the blood pressure medications actually make her feel better  Takes PPI occasionally only as needed for heartburn    Patient Active Problem List    Diagnosis Date Noted   • Abdominal pain 10/18/2021   • Toothache 06/10/2021   • Shortness of breath 04/02/2021   • Dermatitis 02/26/2021   • Chronic back pain 10/06/2020   • Malaise and fatigue 10/06/2020   • Dyslipidemia 02/27/2020   • Prediabetes 02/27/2020   • Restless legs 01/24/2020   • Migraine 10/28/2014   • Current smoker 10/28/2014   • HTN (hypertension) 10/07/2013   • OA (osteoarthritis) 05/30/2013   • Dyspepsia 05/30/2013   • Depression 05/30/2013   • Anxiety 05/30/2013       Allergies:Acetaminophen-codeine, Tylenol w/codeine [acetaminophen-codeine], and Lisinopril    Current Outpatient Medications   Medication Sig Dispense Refill   • amitriptyline (ELAVIL) 10 MG Tab Take 1 Tablet by mouth every evening. 90 Tablet 0   • chlorthalidone (HYGROTON) 25 MG Tab Take 0.5 Tablets by mouth every day. 45 Tablet 0   • SUMAtriptan (IMITREX) 50 MG Tab Take 1 Tablet by mouth one time as needed for Migraine for up to 1 dose. 10 Tablet 3   • atorvastatin (LIPITOR) 20 MG Tab Take 1 Tablet  "by mouth every evening. 90 Tablet 0   • losartan (COZAAR) 50 MG Tab Take 1 Tablet by mouth every day. 90 Tablet 0   • naproxen (NAPROSYN) 500 MG Tab Take 1 Tablet by mouth 2 times a day as needed (joint pain). 30 Tablet 0   • nicotine (NICODERM) 7 MG/24HR PATCH 24 HR Place 1 Patch on the skin every 24 hours. 30 Patch 3   • buPROPion (WELLBUTRIN) 75 MG Tab Take 1 Tablet by mouth 2 times a day. 90 Tablet 0   • omeprazole (PRILOSEC) 20 MG delayed-release capsule Take 1 Capsule by mouth every day. (Patient not taking: Reported on 2/4/2022) 90 Capsule 2     No current facility-administered medications for this visit.       Social History     Tobacco Use   • Smoking status: Current Some Day Smoker     Packs/day: 0.30     Years: 3.00     Pack years: 0.90     Types: Cigarettes   • Smokeless tobacco: Never Used   Substance Use Topics   • Alcohol use: No   • Drug use: No       Family History   Problem Relation Age of Onset   • Cancer Mother         uterine cancer       ROS  See HPI.    BP (!) 98/55 (BP Location: Right arm, Patient Position: Sitting, BP Cuff Size: Adult)   Pulse 83   Temp 36 °C (96.8 °F) (Temporal)   Ht 1.651 m (5' 5\")   Wt 59.9 kg (132 lb)   SpO2 93%  Body mass index is 21.97 kg/m².  Physical Exam  Constitutional:       General: She is not in acute distress.     Appearance: Normal appearance.   HENT:      Head: Normocephalic and atraumatic.   Cardiovascular:      Rate and Rhythm: Normal rate and regular rhythm.      Heart sounds: No murmur heard.  No gallop.    Pulmonary:      Effort: Pulmonary effort is normal. No respiratory distress.      Breath sounds: Normal breath sounds. No wheezing or rhonchi.   Neurological:      Mental Status: She is alert.          Assessment/Plan:     1. Primary hypertension  Her blood pressure is on the low end 90s over 50s, has not had any syncopal episodes related to this but is overtreated  Instructed to decrease chlorthalidone to 12.5 mg and monitor BP with log to " follow-up in 5 weeks    2. Migraine without status migrainosus, not intractable, unspecified migraine type  - SUMAtriptan (IMITREX) 50 MG Tab; Take 1 Tablet by mouth one time as needed for Migraine for up to 1 dose.  Dispense: 10 Tablet; Refill: 3    3. Current smoker  Counseling performed    4. Recurrent major depressive disorder, in partial remission (HCC)  Per patient not adequately controlled, taken Wellbutrin many years prior with better results (unclear if she was taking amitriptyline at this time as well), the combination of bupropion and TCA can lower the seizure threshold.  She does not have a history of seizures and initially considered a taper from sertraline and starting bupropion however this is still a high risk combination and would like her to see psychiatry to evaluate the best course of action with these medications given the high risk use    5. Dyspepsia  Continue as needed PPI    6. High risk medication use  Will address labs and screening at next visit  Referred to Psychiatry for appropriate medication titration, she is only on the lowest dose of sertraline but TCAs can increase this level    Other orders  - amitriptyline (ELAVIL) 10 MG Tab; Take 1 Tablet by mouth every evening.  Dispense: 90 Tablet; Refill: 0  - chlorthalidone (HYGROTON) 25 MG Tab; Take 0.5 Tablets by mouth every day.  Dispense: 45 Tablet; Refill: 0  - atorvastatin (LIPITOR) 20 MG Tab; Take 1 Tablet by mouth every evening.  Dispense: 90 Tablet; Refill: 0  - losartan (COZAAR) 50 MG Tab; Take 1 Tablet by mouth every day.  Dispense: 90 Tablet; Refill: 0  - naproxen (NAPROSYN) 500 MG Tab; Take 1 Tablet by mouth 2 times a day as needed (joint pain).  Dispense: 30 Tablet; Refill: 0  - nicotine (NICODERM) 7 MG/24HR PATCH 24 HR; Place 1 Patch on the skin every 24 hours.  Dispense: 30 Patch; Refill: 3  - sertraline (ZOLOFT) 50 MG Tab; Take 1 Tablet by mouth every day.  Dispense: 30 Tablet; Refill: 0      The major risks of all  medications prescribed were explained and any questions answered.    All imaging results and lab results and consult notes are reviewed at this visit.  Followup: Return in about 5 weeks (around 3/11/2022).

## 2022-02-04 NOTE — TELEPHONE ENCOUNTER
Called patient's number to discuss previous use of amitriptyline along with other depression medications, which she did not , call alternate number which daughter Mulu picked up.  Per chart notes there is no permission listed to discuss treatment with daughter but she was going to have her mother call the office back.  Tried a second time later but went to voicemail.    During the visit we discussed using bupropion instead of sertraline however the combination with amitriptyline can have major side effects and I want this evaluated by a psychiatrist to see which is appropriate medication to start on.  Please call pharmacy and cancel prescription of bupropion and when patient calls back instruct her to continue her current doses of amitriptyline 10 mg and sertraline 50 mg until she sees psychiatry, I have placed a referral.    Her other instructions to decrease her chlorthalidone to 12.5 mg daily and check her blood pressure total next visit are unchanged.

## 2022-02-08 NOTE — TELEPHONE ENCOUNTER
Attempted to call patient back, left confidential message on VM to call our office back with the instructions I have previously written. To summarize, she should continue on her previous doses of medication (see first message) as she was before the visit except clorthalidone which should be decreased to 12.5mg. Please also make sure the bupropion prescription was cancelled at the pharmacy.Thank you

## 2022-02-17 ENCOUNTER — TELEPHONE (OUTPATIENT)
Dept: INTERNAL MEDICINE | Facility: OTHER | Age: 67
End: 2022-02-17
Payer: MEDICARE

## 2022-02-17 NOTE — LETTER
Dear Angely,    I wanted to get in touch with you regarding the medications we discussed at your last visit. We did discuss possibly switching the sertraline to something that would work better, such as the bupropion (wellbutrin) you were on before. However with the wellbutrin there can be significant interactions with the amitriptyline you have been taking. I would like the psychiatry specialists to evaluate your medications and give advice or options on which medication would be best to use or switch. In the meantime, I would recommend to keep taking the sertraline and amitriptyline at the previous doses. Please do continue the changes we discussed with your blood pressure medication (chlorthalidone), decreasing the dose by half and checking your blood pressure at least daily.    I have ordered a referral to the specialist. You should be getting information on the office to which you were referred by phone call or mail. If you do not receive it please call our office with any questions. Thank you and I look forward to seeing you at our next visit.    Sincerely,    Dr. Perez

## 2022-03-08 ENCOUNTER — OFFICE VISIT (OUTPATIENT)
Dept: INTERNAL MEDICINE | Facility: OTHER | Age: 67
End: 2022-03-08
Payer: MEDICARE

## 2022-03-08 VITALS
HEIGHT: 62 IN | OXYGEN SATURATION: 96 % | TEMPERATURE: 97.5 F | DIASTOLIC BLOOD PRESSURE: 67 MMHG | SYSTOLIC BLOOD PRESSURE: 116 MMHG | WEIGHT: 135.2 LBS | HEART RATE: 69 BPM | BODY MASS INDEX: 24.88 KG/M2

## 2022-03-08 DIAGNOSIS — Z12.11 SCREENING FOR MALIGNANT NEOPLASM OF COLON: ICD-10-CM

## 2022-03-08 DIAGNOSIS — K63.5 SESSILE COLONIC POLYP: ICD-10-CM

## 2022-03-08 DIAGNOSIS — L30.9 DERMATITIS: ICD-10-CM

## 2022-03-08 DIAGNOSIS — E78.5 DYSLIPIDEMIA: ICD-10-CM

## 2022-03-08 DIAGNOSIS — R73.03 PREDIABETES: ICD-10-CM

## 2022-03-08 DIAGNOSIS — F41.9 ANXIETY: ICD-10-CM

## 2022-03-08 DIAGNOSIS — F33.41 RECURRENT MAJOR DEPRESSIVE DISORDER, IN PARTIAL REMISSION (HCC): ICD-10-CM

## 2022-03-08 PROCEDURE — 99213 OFFICE O/P EST LOW 20 MIN: CPT | Mod: GE | Performed by: STUDENT IN AN ORGANIZED HEALTH CARE EDUCATION/TRAINING PROGRAM

## 2022-03-08 RX ORDER — SUMATRIPTAN 25 MG/1
25 TABLET, FILM COATED ORAL
Qty: 10 TABLET | Refills: 3
Start: 2022-03-08 | End: 2022-08-19 | Stop reason: SDUPTHER

## 2022-03-08 ASSESSMENT — ENCOUNTER SYMPTOMS
DEPRESSION: 1
MYALGIAS: 0

## 2022-03-08 ASSESSMENT — FIBROSIS 4 INDEX
FIB4 SCORE: 1.22
FIB4 SCORE: 1.22

## 2022-03-09 ENCOUNTER — TELEPHONE (OUTPATIENT)
Dept: INTERNAL MEDICINE | Facility: OTHER | Age: 67
End: 2022-03-09
Payer: MEDICARE

## 2022-03-09 NOTE — ASSESSMENT & PLAN NOTE
Referred already to psychiatry for medication management since she is on TCA and uncontrolled/side effect of headache with SSRI low-dose, will plan to schedule

## 2022-03-09 NOTE — PATIENT INSTRUCTIONS
Please get Fasting lab tests done one week before next visit. Avoid eating 8-10 hours before, sips of water are ok.      Special diet instructions:    A limited carbohydrate diet is made up of around 45-60 carbohydrates per meal. A snack would have about 15 grams of carbohydrates. Limiting carbohydrates is helpful in controlling blood sugar and maintaining a healthy weight. Choose foods that are rich in vitamins, minerals, and fiber such as whole grains and natural non canned/piter fruits and vegetables. Satisfy your hunger thoughout the day by eating your carbs rather than drinking them. Avoid processed foods such as packaged cookies, crackers and canned fruit; these usually contain added salt, sugar, carbohydrates, fat or preservatives.

## 2022-03-09 NOTE — LETTER
Dear Angely,    I was able to find the ultrasound that you had done in November 2021 at [facility], it was of your heart, and it showed that your heart's size and pumping function was normal with no significant problems with the valves.  Overall this is a very good result which does not show any major structural problems with your heart.  Thank you and I look forward to seeing you at your next visit.    Sincerely,  Dr. Perez

## 2022-03-09 NOTE — ASSESSMENT & PLAN NOTE
Was previously on Metformin did not have a refill several months back so not on currently  We will recheck A1c

## 2022-03-09 NOTE — PROGRESS NOTES
3/8/2022  3:41 PM    Chief Complaint   Patient presents with   • Follow-Up   • Lab Results   • Overdue Lab And Imaging Result Follow-up     Pt states she got labs and imaging done   • Thyroid Follow-up     Pt wants labs done for thyroid, and diabetes   • Depression     Requesting wellbutrin, sertraline causing headaches       History of Present Illness:  66 y.o. female established patient who presents today for a followup visit.     She reports stopping her sertraline due to headaches, feels it was not helping her depression.  Wellbutrin helped in the past but as discussed previously it has serious interactions with the amitriptyline which she takes for migraines and chronic pain.  Has quit smoking, her headaches have greatly decreased with this.    Patient Active Problem List    Diagnosis Date Noted   • Abdominal pain 10/18/2021   • Dermatitis 02/26/2021   • Chronic back pain 10/06/2020   • Malaise and fatigue 10/06/2020   • Dyslipidemia 02/27/2020   • Prediabetes 02/27/2020   • Restless legs 01/24/2020   • Migraine 10/28/2014   • OA (osteoarthritis) 05/30/2013   • Dyspepsia 05/30/2013   • Depression 05/30/2013   • Anxiety 05/30/2013       Allergies:Acetaminophen-codeine, Tylenol w/codeine [acetaminophen-codeine], and Lisinopril    Current Outpatient Medications   Medication Sig Dispense Refill   • SUMAtriptan (IMITREX) 25 MG Tab tablet Take 1 Tablet by mouth one time as needed for Migraine for up to 1 dose. 10 Tablet 3   • amitriptyline (ELAVIL) 10 MG Tab Take 1 Tablet by mouth every evening. 90 Tablet 0   • chlorthalidone (HYGROTON) 25 MG Tab Take 0.5 Tablets by mouth every day. 45 Tablet 0   • atorvastatin (LIPITOR) 20 MG Tab Take 1 Tablet by mouth every evening. 90 Tablet 0   • losartan (COZAAR) 50 MG Tab Take 1 Tablet by mouth every day. 90 Tablet 0   • omeprazole (PRILOSEC) 20 MG delayed-release capsule Take 1 Capsule by mouth every day. 90 Capsule 2     No current facility-administered medications for this  "visit.       Social History     Tobacco Use   • Smoking status: Former Smoker     Packs/day: 0.30     Years: 3.00     Pack years: 0.90     Types: Cigarettes   • Smokeless tobacco: Never Used   Vaping Use   • Vaping Use: Never used   Substance Use Topics   • Alcohol use: No   • Drug use: No       Family History   Problem Relation Age of Onset   • Cancer Mother         uterine cancer       Review of Systems   Musculoskeletal: Negative for myalgias.   Psychiatric/Behavioral: Positive for depression.     See HPI.    /67 (BP Location: Right arm, Patient Position: Sitting, BP Cuff Size: Adult)   Pulse 69   Temp 36.4 °C (97.5 °F) (Temporal)   Ht 1.575 m (5' 2\")   Wt 61.3 kg (135 lb 3.2 oz)   SpO2 96%  Body mass index is 24.73 kg/m².  Physical Exam  Constitutional:       General: She is not in acute distress.     Appearance: Normal appearance.   HENT:      Head: Normocephalic and atraumatic.   Cardiovascular:      Rate and Rhythm: Normal rate and regular rhythm.      Heart sounds: No murmur heard.    No gallop.   Pulmonary:      Effort: Pulmonary effort is normal. No respiratory distress.      Breath sounds: Normal breath sounds. No wheezing or rhonchi.   Neurological:      General: No focal deficit present.      Mental Status: She is alert and oriented to person, place, and time.   Psychiatric:         Mood and Affect: Mood normal.         Behavior: Behavior normal.          Assessment/Plan:     Depression  Referred already to psychiatry for medication management since she is on TCA and uncontrolled/side effect of headache with SSRI low-dose, will plan to schedule    Prediabetes  Was previously on Metformin did not have a refill several months back so not on currently  We will recheck A1c    Dyslipidemia  Recheck current labs TSH CMP lipid panel    Orders Placed This Encounter   • TSH (Thyroid Stimulating Hormone)   • Hemoglobin A1C   • Hepatic Function Panel   • Basic Metabolic Panel (BMP)   • Lipid Profile " (Lipid Panel)   • Referral to GI for Colonoscopy   • SUMAtriptan (IMITREX) 25 MG Tab tablet       The major risks of all medications prescribed were explained and any questions answered.    All imaging results and lab results and consult notes are reviewed at this visit.  Followup: Return in about 10 weeks (around 5/17/2022) for annual wellness visit.

## 2022-05-18 ENCOUNTER — APPOINTMENT (OUTPATIENT)
Dept: INTERNAL MEDICINE | Facility: OTHER | Age: 67
End: 2022-05-18
Payer: MEDICARE

## 2022-05-20 ENCOUNTER — TELEPHONE (OUTPATIENT)
Dept: INTERNAL MEDICINE | Facility: OTHER | Age: 67
End: 2022-05-20
Payer: MEDICARE

## 2022-05-20 DIAGNOSIS — Z20.822 SUSPECTED COVID-19 VIRUS INFECTION: ICD-10-CM

## 2022-05-20 NOTE — TELEPHONE ENCOUNTER
Sorry to hear that she's sick, I think both she and her mom should get tested for flu and COVID since the cases have dramatically increased. with their symptoms, I have ordered them a swab test (lab collect) to get done at any [facility] location. If they have continued or worsening fever or cough, nausea/vomiting I would encourage them to go to urgent care or ER to get tested and seen. Antibiotics unfortunately won't help if it is a virus and they would need to be examined for symptoms to see if they would help.

## 2022-08-18 ENCOUNTER — TELEPHONE (OUTPATIENT)
Dept: INTERNAL MEDICINE | Facility: OTHER | Age: 67
End: 2022-08-18
Payer: MEDICARE

## 2022-08-18 NOTE — TELEPHONE ENCOUNTER
Pt is requesting a refill for all current medications. She is currently on the phone with Med Scheduling to set up a re-estab appt.

## 2022-08-19 ENCOUNTER — TELEPHONE (OUTPATIENT)
Dept: INTERNAL MEDICINE | Facility: OTHER | Age: 67
End: 2022-08-19
Payer: MEDICARE

## 2022-08-19 RX ORDER — AMITRIPTYLINE HYDROCHLORIDE 10 MG/1
10 TABLET, FILM COATED ORAL NIGHTLY
Qty: 30 TABLET | Refills: 0 | Status: SHIPPED | OUTPATIENT
Start: 2022-08-19 | End: 2022-09-28

## 2022-08-19 RX ORDER — CHLORTHALIDONE 25 MG/1
12.5 TABLET ORAL DAILY
Qty: 15 TABLET | Refills: 0 | Status: SHIPPED | OUTPATIENT
Start: 2022-08-19 | End: 2022-09-28 | Stop reason: SDUPTHER

## 2022-08-19 RX ORDER — SUMATRIPTAN 25 MG/1
25 TABLET, FILM COATED ORAL
Qty: 10 TABLET | Refills: 0 | Status: SHIPPED | OUTPATIENT
Start: 2022-08-19 | End: 2023-01-09

## 2022-08-19 RX ORDER — LOSARTAN POTASSIUM 50 MG/1
50 TABLET ORAL DAILY
Qty: 30 TABLET | Refills: 0 | Status: SHIPPED | OUTPATIENT
Start: 2022-08-19 | End: 2022-09-28 | Stop reason: SDUPTHER

## 2022-08-19 RX ORDER — OMEPRAZOLE 20 MG/1
20 CAPSULE, DELAYED RELEASE ORAL DAILY
Qty: 30 CAPSULE | Refills: 0 | Status: SHIPPED | OUTPATIENT
Start: 2022-08-19 | End: 2022-09-28

## 2022-08-19 RX ORDER — ATORVASTATIN CALCIUM 20 MG/1
20 TABLET, FILM COATED ORAL NIGHTLY
Qty: 30 TABLET | Refills: 0 | Status: SHIPPED | OUTPATIENT
Start: 2022-08-19 | End: 2022-09-28 | Stop reason: SDUPTHER

## 2022-08-19 NOTE — TELEPHONE ENCOUNTER
Please call patient o let her know that I have refilled all her medications for 30 days - this will last until she sees Dr. Lackey, who can then decide which meds are appropriate to continue.

## 2022-09-14 RX ORDER — CHLORTHALIDONE 25 MG/1
TABLET ORAL
Qty: 15 TABLET | Refills: 0 | OUTPATIENT
Start: 2022-09-14

## 2022-09-14 NOTE — TELEPHONE ENCOUNTER
Chlorthalidone Refill    Last seen: 3/8/22 by Dr. Perez  Next appt: None    Was the patient seen in the last year in this department? Yes   Does patient have an active prescription for medications requested? No   Received Request Via: Pharmacy

## 2022-09-14 NOTE — TELEPHONE ENCOUNTER
Please call patient and schedule an appointment. It looks like an appointment was supposed to be scheduled last time but never completed. Patient needs to be seen.

## 2022-09-28 ENCOUNTER — OFFICE VISIT (OUTPATIENT)
Dept: INTERNAL MEDICINE | Facility: OTHER | Age: 67
End: 2022-09-28
Payer: MEDICARE

## 2022-09-28 VITALS
SYSTOLIC BLOOD PRESSURE: 112 MMHG | OXYGEN SATURATION: 97 % | HEIGHT: 64 IN | TEMPERATURE: 98 F | HEART RATE: 71 BPM | WEIGHT: 128.4 LBS | DIASTOLIC BLOOD PRESSURE: 64 MMHG | BODY MASS INDEX: 21.92 KG/M2

## 2022-09-28 DIAGNOSIS — L65.9 ALOPECIA: ICD-10-CM

## 2022-09-28 DIAGNOSIS — E78.5 DYSLIPIDEMIA: ICD-10-CM

## 2022-09-28 DIAGNOSIS — R73.03 PREDIABETES: ICD-10-CM

## 2022-09-28 DIAGNOSIS — I10 PRIMARY HYPERTENSION: ICD-10-CM

## 2022-09-28 DIAGNOSIS — Z23 ENCOUNTER FOR VACCINATION: ICD-10-CM

## 2022-09-28 DIAGNOSIS — F32.2 CURRENT SEVERE EPISODE OF MAJOR DEPRESSIVE DISORDER WITHOUT PSYCHOTIC FEATURES WITHOUT PRIOR EPISODE (HCC): ICD-10-CM

## 2022-09-28 PROCEDURE — G0008 ADMIN INFLUENZA VIRUS VAC: HCPCS | Performed by: STUDENT IN AN ORGANIZED HEALTH CARE EDUCATION/TRAINING PROGRAM

## 2022-09-28 PROCEDURE — 90662 IIV NO PRSV INCREASED AG IM: CPT | Performed by: STUDENT IN AN ORGANIZED HEALTH CARE EDUCATION/TRAINING PROGRAM

## 2022-09-28 PROCEDURE — 99213 OFFICE O/P EST LOW 20 MIN: CPT | Mod: 25,GE | Performed by: STUDENT IN AN ORGANIZED HEALTH CARE EDUCATION/TRAINING PROGRAM

## 2022-09-28 RX ORDER — LOSARTAN POTASSIUM 50 MG/1
50 TABLET ORAL DAILY
Qty: 90 TABLET | Refills: 0 | Status: SHIPPED | OUTPATIENT
Start: 2022-09-28 | End: 2022-12-29

## 2022-09-28 RX ORDER — NAPROXEN 500 MG/1
500 TABLET ORAL
Qty: 60 TABLET | Refills: 0 | Status: SHIPPED | OUTPATIENT
Start: 2022-09-28 | End: 2023-01-09

## 2022-09-28 RX ORDER — CHLORTHALIDONE 25 MG/1
12.5 TABLET ORAL DAILY
Qty: 45 TABLET | Refills: 0 | Status: SHIPPED | OUTPATIENT
Start: 2022-09-28 | End: 2022-12-29

## 2022-09-28 RX ORDER — BUPROPION HYDROCHLORIDE 150 MG/1
150 TABLET, EXTENDED RELEASE ORAL DAILY
Qty: 60 TABLET | Refills: 0 | Status: SHIPPED | OUTPATIENT
Start: 2022-09-28 | End: 2022-11-21

## 2022-09-28 RX ORDER — ATORVASTATIN CALCIUM 20 MG/1
20 TABLET, FILM COATED ORAL NIGHTLY
Qty: 90 TABLET | Refills: 0 | Status: SHIPPED | OUTPATIENT
Start: 2022-09-28 | End: 2022-12-29

## 2022-09-28 RX ORDER — SUMATRIPTAN 50 MG/1
TABLET, FILM COATED ORAL
COMMUNITY
Start: 2022-09-05 | End: 2022-09-28

## 2022-09-28 RX ORDER — NAPROXEN 500 MG/1
500 TABLET ORAL
COMMUNITY
End: 2022-09-28 | Stop reason: SDUPTHER

## 2022-09-28 ASSESSMENT — ENCOUNTER SYMPTOMS
NERVOUS/ANXIOUS: 1
PALPITATIONS: 0
VOMITING: 0
DOUBLE VISION: 0
DIZZINESS: 0
NAUSEA: 0
SHORTNESS OF BREATH: 0
HEADACHES: 0
CONSTIPATION: 0
ABDOMINAL PAIN: 0
MYALGIAS: 0
DEPRESSION: 1
CHILLS: 0
WEAKNESS: 0
DIARRHEA: 0
COUGH: 0
BLURRED VISION: 0
FEVER: 0

## 2022-09-28 ASSESSMENT — LIFESTYLE VARIABLES: SUBSTANCE_ABUSE: 0

## 2022-09-28 ASSESSMENT — FIBROSIS 4 INDEX: FIB4 SCORE: 1.22

## 2022-09-28 ASSESSMENT — PATIENT HEALTH QUESTIONNAIRE - PHQ9
SUM OF ALL RESPONSES TO PHQ QUESTIONS 1-9: 17
CLINICAL INTERPRETATION OF PHQ2 SCORE: 5
5. POOR APPETITE OR OVEREATING: 1 - SEVERAL DAYS

## 2022-09-28 NOTE — ASSESSMENT & PLAN NOTE
- PHQ-9 score of 17 on today's visit (moderate-severe)  - Not on any medication  - Tolerated Wellbutrin in past very well - would like to be restarted  - Will restart on 150 mg, daily and have patient follow-up in 6 weeks for reassessment

## 2022-09-28 NOTE — ASSESSMENT & PLAN NOTE
- A1c 6.1% in July 2021  - Not on any medications but patient has made multiple diet changes in past  - Will recheck A1c and lipid panel    - Will start Metformin IF appropriate

## 2022-09-28 NOTE — PROGRESS NOTES
Established Patient    Angely Nassar is a 66 YOF with PMHx of pre-diabetes, HTN, depression, who presents today with the following:    CC: Re-establish care    HPI:      Depression - was seen last for this 6 months ago - at that time, pt had discussed restarting Wellbutrin (pt had been on this in past). Pt is currently on Amitriptyline - was unable to tolerate low-dose SSRI. She was referred to Psychiatry back in February to discuss restarting Wellbutrin - however, pt has yet to see them. She reports that she was referred to Psychiatry as she was on multiple antidepressants but since she's not on any currently, she does not think it's necessary. She has not been on any medication since March. Today, she reports that she has been losing a lot of hair and feels that her depression and her anxiety are not controlled. She would like to be restarted on Wellbutrin - was on it 37 years ago and had been doing well on it (was on 300 mg, daily).        Pre-diabetes: Last A1c 6.1% in July 2021. Pt is currently not on any diabetes medications but would like to get her A1c rechecked. She was previously on Metformin and would like to be restarted on it.         HTN - BP well-controlled today in clinic. She is currently on Losartan (50 mg, daily) and Chlorthalidone (12.5 mg, daily).       Review of Systems   Constitutional:  Negative for chills and fever.   HENT:  Negative for hearing loss and tinnitus.    Eyes:  Negative for blurred vision and double vision.   Respiratory:  Negative for cough and shortness of breath.    Cardiovascular:  Negative for chest pain, palpitations and leg swelling.   Gastrointestinal:  Negative for abdominal pain, constipation, diarrhea, nausea and vomiting.   Musculoskeletal:  Negative for joint pain and myalgias.   Neurological:  Negative for dizziness, weakness and headaches.   Psychiatric/Behavioral:  Positive for depression. Negative for substance abuse. The patient is nervous/anxious.   "    Patient Active Problem List    Diagnosis Date Noted    Alopecia 09/28/2022    Abdominal pain 10/18/2021    Dermatitis 02/26/2021    Chronic back pain 10/06/2020    Malaise and fatigue 10/06/2020    Dyslipidemia 02/27/2020    Prediabetes 02/27/2020    Restless legs 01/24/2020    Migraine 10/28/2014    Primary hypertension 10/07/2013    OA (osteoarthritis) 05/30/2013    Dyspepsia 05/30/2013    Depression 05/30/2013    Anxiety 05/30/2013       Social History     Tobacco Use    Smoking status: Former     Packs/day: 0.30     Years: 3.00     Pack years: 0.90     Types: Cigarettes    Smokeless tobacco: Never   Vaping Use    Vaping Use: Never used   Substance Use Topics    Alcohol use: No    Drug use: No       Current Outpatient Medications   Medication Sig Dispense Refill    losartan (COZAAR) 50 MG Tab Take 1 Tablet by mouth every day. 90 Tablet 0    chlorthalidone (HYGROTON) 25 MG Tab Take 0.5 Tablets by mouth every day. 45 Tablet 0    atorvastatin (LIPITOR) 20 MG Tab Take 1 Tablet by mouth every evening. 90 Tablet 0    buPROPion SR (WELLBUTRIN-SR) 150 MG TABLET SR 12 HR sustained-release tablet Take 1 Tablet by mouth every day. 60 Tablet 0    naproxen (NAPROSYN) 500 MG Tab Take 1 Tablet by mouth 1 time a day as needed (Pain). 60 Tablet 0    SUMAtriptan (IMITREX) 25 MG Tab tablet Take 1 Tablet by mouth one time as needed for Migraine for up to 1 dose. 10 Tablet 0     No current facility-administered medications for this visit.       /64 (BP Location: Left arm, Patient Position: Sitting, BP Cuff Size: Adult)   Pulse 71   Temp 36.7 °C (98 °F) (Temporal)   Ht 1.626 m (5' 4\")   Wt 58.2 kg (128 lb 6.4 oz)   LMP 12/01/2006   SpO2 97%   BMI 22.04 kg/m²     PHYSICAL EXAM:  Physical Exam  Constitutional:       General: She is not in acute distress.     Appearance: Normal appearance. She is not ill-appearing.   HENT:      Head: Normocephalic and atraumatic.   Eyes:      Extraocular Movements: Extraocular movements " intact.      Conjunctiva/sclera: Conjunctivae normal.   Cardiovascular:      Rate and Rhythm: Normal rate and regular rhythm.      Pulses: Normal pulses.      Heart sounds: Normal heart sounds. No murmur heard.    No friction rub. No gallop.   Pulmonary:      Effort: Pulmonary effort is normal. No respiratory distress.      Breath sounds: Normal breath sounds. No wheezing or rales.   Neurological:      General: No focal deficit present.      Mental Status: She is alert and oriented to person, place, and time. Mental status is at baseline.   Psychiatric:         Behavior: Behavior normal.      Comments: Anxious         Assessment and Plan  Depression  - PHQ-9 score of 17 on today's visit (moderate-severe)  - Not on any medication  - Tolerated Wellbutrin in past very well - would like to be restarted  - Will restart on 150 mg, daily and have patient follow-up in 6 weeks for reassessment    Prediabetes  - A1c 6.1% in July 2021  - Not on any medications but patient has made multiple diet changes in past  - Will recheck A1c and lipid panel    - Will start Metformin IF appropriate    Primary hypertension  - BP well-controlled in clinic  - Continue Losartan and Chlorthalidone    Alopecia  - Has been ongoing issue for past few months  - Unclear etiology - may be stress-related from depression vs hypothyroidism vs hormonal imbalance  - Will check TSH, T4, FSH, LH, and estradiol      Return in about 3 weeks (around 10/19/2022) for Dr. Cotter.    Signed by: Keesha Malin M.D.

## 2022-09-28 NOTE — ASSESSMENT & PLAN NOTE
- Has been ongoing issue for past few months  - Unclear etiology - may be stress-related from depression vs hypothyroidism vs hormonal imbalance  - Will check TSH, T4, FSH, LH, and estradiol

## 2022-10-20 ENCOUNTER — OFFICE VISIT (OUTPATIENT)
Dept: INTERNAL MEDICINE | Facility: OTHER | Age: 67
End: 2022-10-20
Payer: MEDICARE

## 2022-10-20 VITALS
TEMPERATURE: 97.8 F | OXYGEN SATURATION: 97 % | HEIGHT: 62 IN | HEART RATE: 69 BPM | BODY MASS INDEX: 23.37 KG/M2 | WEIGHT: 127 LBS | SYSTOLIC BLOOD PRESSURE: 126 MMHG | DIASTOLIC BLOOD PRESSURE: 76 MMHG

## 2022-10-20 DIAGNOSIS — Z00.00 ENCOUNTER FOR PREVENTIVE CARE: ICD-10-CM

## 2022-10-20 DIAGNOSIS — E78.5 DYSLIPIDEMIA: ICD-10-CM

## 2022-10-20 DIAGNOSIS — L65.9 ALOPECIA: ICD-10-CM

## 2022-10-20 DIAGNOSIS — I10 PRIMARY HYPERTENSION: ICD-10-CM

## 2022-10-20 DIAGNOSIS — Z12.31 ENCOUNTER FOR SCREENING MAMMOGRAM FOR MALIGNANT NEOPLASM OF BREAST: ICD-10-CM

## 2022-10-20 DIAGNOSIS — F32.2 CURRENT SEVERE EPISODE OF MAJOR DEPRESSIVE DISORDER WITHOUT PSYCHOTIC FEATURES WITHOUT PRIOR EPISODE (HCC): ICD-10-CM

## 2022-10-20 DIAGNOSIS — L30.9 DERMATITIS: ICD-10-CM

## 2022-10-20 DIAGNOSIS — R73.03 PREDIABETES: ICD-10-CM

## 2022-10-20 PROBLEM — R10.9 ABDOMINAL PAIN: Status: RESOLVED | Noted: 2021-10-18 | Resolved: 2022-10-20

## 2022-10-20 PROBLEM — R53.81 MALAISE AND FATIGUE: Status: RESOLVED | Noted: 2020-10-06 | Resolved: 2022-10-20

## 2022-10-20 PROBLEM — R53.83 MALAISE AND FATIGUE: Status: RESOLVED | Noted: 2020-10-06 | Resolved: 2022-10-20

## 2022-10-20 PROCEDURE — 99214 OFFICE O/P EST MOD 30 MIN: CPT | Mod: GC

## 2022-10-20 NOTE — PROGRESS NOTES
Office Visit Initial Encounter    Chief Complaint   Patient presents with    Follow-Up    Labs Only       HPI    Ms. Nassar is a 67 yo female with PMHx of HTN, dyslipidemia, migraines, prediabetes, major depressive disorder, osteoarthritis with chronic back pain who comes for establishment of care with me.  Last visit with Dr. Thao her PHQ-9 was 17 and she was started on bupropion 150 mg, the previous years she had been treated with amitriptyline and sertraline among other antidepressants, but patient stated she feels the best effect with bupropion.  Today she reports feeling doing much better, she is more motivated for daily activities, socially performing well, is sleeping better, finding more pleasure and feeling much more energy during the daytime.  Appetite is good. The patient was also complaining of dryness and loss of hair all over the head, with no pruritus, no lesions, also occasional dryness over the hands no evident lesions.     She is still smoking 3 to 4 cigarettes a day, denies drinking, denies other substance abuse.      Past Medical History  Past Medical History:   Diagnosis Date    Active smoker 10/28/2014    Anxiety     Depression     Headache(784.0)     HTN (hypertension) 10/7/2013    Hypertension     Shortness of breath 4/2/2021    Toothache 6/10/2021    Uterine cancer (HCC) 1994    biopsy and excision/ no hysterectomy        Allergies:     Acetaminophen-codeine, Tylenol w/codeine [acetaminophen-codeine], and Lisinopril    Medications    Current Outpatient Medications:     losartan, 50 mg, Oral, DAILY, Taking    chlorthalidone, 12.5 mg, Oral, DAILY, Taking    atorvastatin, 20 mg, Oral, Nightly, Taking    buPROPion SR, 150 mg, Oral, DAILY, Taking    naproxen, 500 mg, Oral, QDAY PRN, Taking    SUMAtriptan, 25 mg, Oral, Once PRN, Taking    Family History  Family History   Problem Relation Age of Onset    Cancer Mother         uterine cancer       Surgical History  Past Surgical  "History:   Procedure Laterality Date    CHOLECYSTECTOMY  2001       Social History   Social History     Tobacco Use    Smoking status: Former     Packs/day: 0.30     Years: 3.00     Pack years: 0.90     Types: Cigarettes    Smokeless tobacco: Never   Vaping Use    Vaping Use: Never used   Substance Use Topics    Alcohol use: No    Drug use: No       ROS     General: No fevers, chills, night sweats, weight loss or gain.  H&N: No hearing changes, vision changes, eye pain, ear pain, nasal discharge, sore throat, no neck swelling.  Pulmonary: No shortness of breath, cough, sputum, or hemoptysis.  Cardiovascular: No chest pain, palpitations, or LE swelling.   GI: No nausea, vomiting, diarrhea, constipation, abdominal pain, hematochezia or melena.  : No dysuria, frequency, retention or hematuria.   MSK: Back pain is well controlled without medical management, she is active walking every day and being able to perform her ADLs.  Lateral hand pain states the same which is very mild when she first wakes up, no other joint compromise, no progressive deformities.  Neuro: No headaches, no lightheadedness, no dizziness. No focal weakness, no general weakness. No gait disturbances.   Psych: No anxiety or depression.  Very occasional panic attacks with somatic symptoms.    Physical Exam     /76 (BP Location: Left arm, Patient Position: Sitting, BP Cuff Size: Adult)   Pulse 69   Temp 36.6 °C (97.8 °F) (Temporal)   Ht 1.575 m (5' 2\")   Wt 57.6 kg (127 lb)   LMP 12/01/2006   SpO2 97%   BMI 23.23 kg/m²     General: Awake, alert and oriented.  Head and Neck: NC/AT, EOMI, no scleral icterus or conjunctival pallor, no nasal discharge or oral erythema or exudates. Neck supple, no cervical or supraclavicular LAD,   CV: Rhythmic heart sounds, no murmurs, gallops or rubs. No S3,S4 or JVD. Radial and dorsalis pedis pulses 2+ and equal bilaterally.   Pulm: Chest expansion is symmetrical, no crackles, rales, rhonchi, or " wheezing.  GI: Flat, non distended, soft, nontender, normal bowel sounds.  Skin: Warm, no rashes, no lesions.  Scalp is dry as well as the hair, no evident lesions, no alopecic areas.  No dermatitis lesion.   MSK: Normal ROM. No lower extremity edema. No pain, no lesions.  PIPs and DIPs are enlarged with bony deformities and redness but no significant swelling or pain to palpation, aligned.  Neuro: Patient is alert and oriented x3. Pupils equal, round and reactive to light. Extra ocular movements intact. Facial and body symmetry. Strength 5 out of 5 in upper and lower extremities. Sensitivity intact. Normal deep tendon reflexes. Normal tone. No gait abnormalities.   Psych: Appropriate mood and affect.     Diagnostic tests  TSH is within normal limits, FSH estradiol and LH are normal for a postmenopausal woman, BMP unremarkable except for glucose of 122 and an A1c of 6.2%.    Note: I have reviewed all pertinent labs and diagnostic tests associated with this visit with specific comments listed under the assessment and plan below    Assessment and Plan    Dermatitis  More likely contact dermatitis. Very occasional symptoms. No evident skin changes.   - Petrolatum ointment during the night or when symptoms arise.    Primary hypertension  Today very controled. Patient to improve lifestyle habits.   - Continue losartan and chlorthalidone  - DASH diet       Depression  Long-time diagnosis. Symptoms significantly improving with bupropion.   - Continue Wellbutrin same dose  - Counseled on meditation-breathing exercises for relaxation (handout)    Prediabetes  A1c stable 6.1-6.2%. Patient has good insight, is participative and committed with lifestyle changes.   - DASH diet  - Counseled on 30 min activity 5 times a week  - A1c in 6 mo    Alopecia  Hair loss, no specific lesion concerning for alopecia areata/pathologic hair loss. TSH wnl, FSH/TSH/E2 wnl for postmenopausal woman.   - Recommended improving vitamins in diet,  possibly Vit C/B/E supplements would help.     Dyslipidemia  Controlled. Lipid panel at goal. Patient working on improving diet and exercise.     Prev. Medicine:  DEXA scan   Due for scheduling colonoscopy   Mammogram  Pap Smear    #OA, back pain   #Migraine    Return in about 3 months (around 1/20/2023).    Katie COUCH PGY1  Internal Medicine [facility]    Pt has been seen and discussed with the Attending Physician

## 2022-10-20 NOTE — PATIENT INSTRUCTIONS
Por favor jj 30 minutos de ejercicio al saulo samina discutimos, le ayuda con la diabetes y con los sintomas de depresion   Por favor tomese la blood presure en la casa surante 1 semana y traigala anotada   Por favor trate de sunni vitamina C, B y E  Por favor apliquese vaselina en las angelica antes de dormir    Por favor jj la hali para el Pap Smear (cervical cancer screening)   Por favor avite el cigarrillo samina discutimos  Por favor maritza la hoja con ejercicios de meditacion y respiracion para encontrar el que mejor se acomode a usted   Por favor venga a verme en 3 meses  Por jj la hali para la colonoscopia:   [facility]  [address]  [place] [address]  Phone: [phone]

## 2022-10-21 NOTE — ASSESSMENT & PLAN NOTE
Hair loss, no specific lesion concerning for alopecia areata/pathologic hair loss. TSH wnl, FSH/TSH/E2 wnl for postmenopausal woman.   - Recommended improving vitamins in diet, possibly Vit C/B/E supplements would help.

## 2022-10-21 NOTE — ASSESSMENT & PLAN NOTE
More likely contact dermatitis. Very occasional symptoms. No evident skin changes.   - Petrolatum ointment during the night or when symptoms arise.

## 2022-10-21 NOTE — ASSESSMENT & PLAN NOTE
Today very controled. Patient to improve lifestyle habits.   - Continue losartan and chlorthalidone  - DASH diet

## 2022-10-21 NOTE — ASSESSMENT & PLAN NOTE
Long-time diagnosis. Symptoms significantly improving with bupropion.   - Continue Wellbutrin same dose  - Counseled on meditation-breathing exercises for relaxation (handout)

## 2022-10-21 NOTE — ASSESSMENT & PLAN NOTE
A1c stable 6.1-6.2%. Patient has good insight, is participative and committed with lifestyle changes.   - DASH diet  - Counseled on 30 min activity 5 times a week  - A1c in 6 mo

## 2022-11-11 ENCOUNTER — TELEPHONE (OUTPATIENT)
Dept: INTERNAL MEDICINE | Facility: OTHER | Age: 67
End: 2022-11-11
Payer: MEDICARE

## 2022-11-11 NOTE — TELEPHONE ENCOUNTER
Patient left  a message on front machine asking for a referral to [facility] for a colonoscopy and endoscopy.  She said if we have any questions to call her at [phone].  She is Mongolian speaking if someone can call her back.  I did see that she has a referral for colo already in system.

## 2022-11-14 NOTE — TELEPHONE ENCOUNTER
Katie Cotter M.D.  You 12 hours ago (10:10 PM)     MF  Hello,     I saw her recently and we discussed the need of a colonoscopy for screening purposes, not for diagnostic purposes and she does not have an indication for an endoscopy. Please let her know that if she has concerns we will go over them in our next appointment and that I will call her over the next week to check on acute concerns or significant GI symptoms that were not present when I met her.     Thanks,

## 2022-11-14 NOTE — TELEPHONE ENCOUNTER
"Phone Number Called: [phone]    Call outcome: Spoke to patient regarding message below.    Message: called the pt and informed her of the GI referral and provided the phone number. Patient is stating she needs to get an endoscopy done also. She feels as if she cant breath because her esophagus\" feels swollen and its hard to swallow\" pt complains of nausea and vomiting in the mornings,she stated that Dr. Cotter was supposed to send in Omeprazole for her last time she was in but never did and when she called to schedule colonoscopy due to her history they recommended to get the EGD as well.    Please review and call pt with updates.  "

## 2022-11-17 NOTE — TELEPHONE ENCOUNTER
Katie Cotter M.D.  You 21 hours ago (3:54 PM)     MF  I am calling the patient to the registered phone number. 3 times without answer, will try again later or tomorrow.

## 2022-11-21 RX ORDER — BUPROPION HYDROCHLORIDE 150 MG/1
150 TABLET, EXTENDED RELEASE ORAL DAILY
Qty: 60 TABLET | Refills: 0 | Status: SHIPPED | OUTPATIENT
Start: 2022-11-21 | End: 2022-12-19

## 2022-11-21 NOTE — TELEPHONE ENCOUNTER
Bupriopion Refill     Received request via: Pharmacy    Was the patient seen in the last year in this department? Yes    Does the patient have an active prescription (recently filled or refills available) for medication(s) requested? No    Does the patient have [facility] Plus and need 100 day supply (blood pressure, diabetes and cholesterol meds only)? Patient does not have SCP

## 2022-11-21 NOTE — TELEPHONE ENCOUNTER
Katie Cotter M.D.  Cindy Franklin, Med Ass't 2 days ago     MF  Hello,  I was not able to get a hold. I left a voice message and will keep trying, also informed that I am going to be at the clinic next week. Until I am able to talk to her I am not refilling her Naproxen because the ongoing complain is related to upper gastrointestinal tract.     Thanks,

## 2022-11-28 NOTE — TELEPHONE ENCOUNTER
Katie Cotter M.D.  You 5 days ago     MF  Hello, I left a voice message in both patient's and daughter's phone.

## 2022-12-19 RX ORDER — BUPROPION HYDROCHLORIDE 150 MG/1
150 TABLET, EXTENDED RELEASE ORAL DAILY
Qty: 60 TABLET | Refills: 0 | Status: SHIPPED | OUTPATIENT
Start: 2022-12-19 | End: 2023-01-09 | Stop reason: SDUPTHER

## 2022-12-29 RX ORDER — LOSARTAN POTASSIUM 50 MG/1
50 TABLET ORAL DAILY
Qty: 90 TABLET | Refills: 0 | Status: SHIPPED | OUTPATIENT
Start: 2022-12-29 | End: 2023-01-09 | Stop reason: SDUPTHER

## 2022-12-29 RX ORDER — ATORVASTATIN CALCIUM 20 MG/1
TABLET, FILM COATED ORAL
Qty: 90 TABLET | Refills: 0 | Status: SHIPPED | OUTPATIENT
Start: 2022-12-29 | End: 2023-01-09 | Stop reason: SDUPTHER

## 2022-12-29 RX ORDER — CHLORTHALIDONE 25 MG/1
TABLET ORAL
Qty: 45 TABLET | Refills: 0 | Status: SHIPPED | OUTPATIENT
Start: 2022-12-29 | End: 2023-01-09 | Stop reason: SDUPTHER

## 2023-01-09 ENCOUNTER — OFFICE VISIT (OUTPATIENT)
Dept: INTERNAL MEDICINE | Facility: OTHER | Age: 68
End: 2023-01-09
Payer: MEDICARE

## 2023-01-09 VITALS
DIASTOLIC BLOOD PRESSURE: 67 MMHG | BODY MASS INDEX: 23.19 KG/M2 | SYSTOLIC BLOOD PRESSURE: 114 MMHG | HEART RATE: 69 BPM | WEIGHT: 126 LBS | OXYGEN SATURATION: 97 % | HEIGHT: 62 IN | TEMPERATURE: 97.2 F

## 2023-01-09 DIAGNOSIS — Z85.41 HISTORY OF CERVICAL CANCER: ICD-10-CM

## 2023-01-09 DIAGNOSIS — G44.219 EPISODIC TENSION-TYPE HEADACHE, NOT INTRACTABLE: ICD-10-CM

## 2023-01-09 DIAGNOSIS — R73.03 PREDIABETES: ICD-10-CM

## 2023-01-09 DIAGNOSIS — N84.3: ICD-10-CM

## 2023-01-09 PROBLEM — G89.29 CHRONIC BACK PAIN: Status: RESOLVED | Noted: 2020-10-06 | Resolved: 2023-01-09

## 2023-01-09 PROBLEM — L65.9 ALOPECIA: Status: RESOLVED | Noted: 2022-09-28 | Resolved: 2023-01-09

## 2023-01-09 PROBLEM — G44.209 TENSION TYPE HEADACHE: Status: ACTIVE | Noted: 2023-01-09

## 2023-01-09 PROBLEM — M54.9 CHRONIC BACK PAIN: Status: RESOLVED | Noted: 2020-10-06 | Resolved: 2023-01-09

## 2023-01-09 PROCEDURE — 99214 OFFICE O/P EST MOD 30 MIN: CPT | Mod: GC

## 2023-01-09 RX ORDER — HYDROXYZINE HYDROCHLORIDE 25 MG/1
25 TABLET, FILM COATED ORAL 3 TIMES DAILY PRN
Qty: 100 TABLET | Refills: 0 | Status: SHIPPED | OUTPATIENT
Start: 2023-01-09 | End: 2023-08-29

## 2023-01-09 RX ORDER — OMEPRAZOLE 20 MG/1
20 CAPSULE, DELAYED RELEASE ORAL DAILY
COMMUNITY
End: 2023-01-09 | Stop reason: SDUPTHER

## 2023-01-09 RX ORDER — BUPROPION HYDROCHLORIDE 150 MG/1
150 TABLET, EXTENDED RELEASE ORAL DAILY
Qty: 180 TABLET | Refills: 0 | Status: SHIPPED | OUTPATIENT
Start: 2023-01-09 | End: 2023-07-08

## 2023-01-09 RX ORDER — ACETAMINOPHEN 500 MG
1000 TABLET ORAL 2 TIMES DAILY PRN
Qty: 100 TABLET | Refills: 0 | Status: SHIPPED | OUTPATIENT
Start: 2023-01-09

## 2023-01-09 RX ORDER — CHLORTHALIDONE 25 MG/1
12.5 TABLET ORAL
Qty: 90 TABLET | Refills: 0 | Status: SHIPPED | OUTPATIENT
Start: 2023-01-09 | End: 2023-07-08

## 2023-01-09 RX ORDER — ATORVASTATIN CALCIUM 20 MG/1
20 TABLET, FILM COATED ORAL EVERY EVENING
Qty: 180 TABLET | Refills: 0 | Status: SHIPPED | OUTPATIENT
Start: 2023-01-09 | End: 2023-06-20

## 2023-01-09 RX ORDER — LOSARTAN POTASSIUM 50 MG/1
50 TABLET ORAL DAILY
Qty: 180 TABLET | Refills: 0 | Status: SHIPPED | OUTPATIENT
Start: 2023-01-09 | End: 2023-07-08

## 2023-01-09 RX ORDER — OMEPRAZOLE 20 MG/1
20 CAPSULE, DELAYED RELEASE ORAL DAILY
Qty: 100 CAPSULE | Refills: 0 | Status: SHIPPED | OUTPATIENT
Start: 2023-01-09 | End: 2023-04-19

## 2023-01-09 ASSESSMENT — PATIENT HEALTH QUESTIONNAIRE - PHQ9
CLINICAL INTERPRETATION OF PHQ2 SCORE: 3
5. POOR APPETITE OR OVEREATING: 1 - SEVERAL DAYS
SUM OF ALL RESPONSES TO PHQ QUESTIONS 1-9: 11

## 2023-01-09 NOTE — PATIENT INSTRUCTIONS
Por favor jj la hali con el ginecologo   Por favor tome tylenol en la ma;maximino para el dolor de adali  Por favor tome Atarax para la ansiedad cuando lo necesite   Por favor continue tomando los otros medicamentos para la presion y el colesterol   Por favor evite el ibuprofeno y naproxeno

## 2023-01-10 NOTE — PROGRESS NOTES
"      Office Visit Follow up    Chief Complaint   Patient presents with    Follow-Up    Gynecologic Exam    Anxiety     Would like to get Xanax for anxiety     Fatigue     Very tired    Shortness of Breath    Other     Brain fog       Subjective    Ms. Nassar is a 67 yo female with PMHx of HTN, dyslipidemia, migraines, prediabetes, major depressive disorder with SOM and osteoarthritis who comes in today for follow up on gastrointestinal symptoms (Hx of PUD in files, unknown result of biopsies for H pylori and Camejo's), complaining of headache, and also for pap-smear. Since around one month she has been complaining of headaches that start in the morning with posterior neck pain coming to the occipital region and lately parietal-temporal region on both sides along with some blurry vision and \"foggy\" sensation, they relief with tylenol and naproxen. No neurologic deficits, the pain is mild and sometimes goes away spontaneously, does not wake her up. When those symptoms arise, she also experiences epigastric abdominal pain with nausea. This pain does not change with food intake or bowel movements. No burning/acid reflux sensation, no change in bowel habits, no hematemesis/melena/hematochezia. She denies B symptoms.     - Last pap smear 10 years ago. She has history of cervical cancer, she mentions something similar to a conization, but unclear if she had different procedures. She denies metastatic lesions or need for any other therapies. Her last 3 pap smears have been negative for lesions She does not have AUB, abnormal vaginal discharge, pelvic pain, groin lesions or itchiness, and does not have active sexual activity. She has not seen a gynecologist for 10 years for follow up.     Hair loss and contact dermatitis are improved significantly. Labs are compatible with post-menopausal status.     ROS     General: No fevers, chills, night sweats, weight loss or gain.  H&N: No hearing changes, vision changes, eye " "pain, ear pain, nasal discharge, sore throat, no neck swelling.  Pulmonary: No shortness of breath, cough, sputum, or hemoptysis.  Cardiovascular: No chest pain, palpitations, or LE swelling.   GI: As above.  : No dysuria, frequency, retention or hematuria.   Neuro: No headaches, no lightheadedness, no dizziness. No focal weakness, no general weakness. No gait disturbances.   Psych: No anxiety or depression.     Physical Exam     /67 (BP Location: Left arm, Patient Position: Sitting, BP Cuff Size: Adult)   Pulse 69   Temp 36.2 °C (97.2 °F) (Temporal)   Ht 1.575 m (5' 2\")   Wt 57.2 kg (126 lb)   LMP 12/01/2006   SpO2 97%   BMI 23.05 kg/m²     General: Awake, alert and oriented.  Head and Neck: NC/AT, EOMI, no scleral icterus or conjunctival pallor, no nasal discharge or oral erythema or exudates. Neck supple, no cervical or supraclavicular LAD,   CV: Rhythmic heart sounds, no murmurs, gallops or rubs. No S3,S4 or JVD. Radial and dorsalis pedis pulses 2+ and equal bilaterally.   Pulm: Chest expansion is symmetrical, no crackles, rales, rhonchi, or wheezing.  GI: Flat, non distended, soft, tender to deep palpation over the epigastric area, no masses, normal bowel sounds.  Skin: Warm, no rashes, no lesions.  : Complete female external genitalia, some vulvar dryness, has a 1cm indurated mobile polyp over the 7 (clock) arising from right labia majora (she reports no pain and mild increase in size but being a chronic lesion for more than 2 years), no lesions in the vaginal wall, no prolapses, cervix hard to observe, altered anatomy and some erythematous plaques close to the EOS. Cytology not performed.   MSK: Normal ROM. No lower extremity edema. No pain, no lesions,   Neuro: Patient is alert and oriented x3. Pupils equal, round and reactive to light. Extra ocular movements intact. Facial and body symmetry. Strength 5 out of 5 in upper and lower extremities. Sensitivity intact. Normal deep tendon reflexes. " Normal tone. No gait abnormalities.   Psych: Appropriate mood and affect.     Diagnostic test    States she did have the DEXA scan done but no access to those     Note: I have reviewed all pertinent labs and diagnostic tests associated with this visit with specific comments listed under the assessment and plan below    Assessment and Plan    Tension type headache   Given symptoms and signs above. She has history of migraines and used sumatriptan for acute episodes, those symptoms are resolved and now she has this current presentation as above.  - Tylenol 1000mg as needed, explained  - Avoid NSAIDs  - Anxiety management as bellow     SOM with panic atacks   Patient currently with few episodes of panic attacks, GI and  headaches being related with constant anxiety symptoms as well. Desired to have Xanax but other options were discussed.   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 She does not have GERD, symptoms are more compatible with possible ulcer.   - She is going to have a GI appt in 1 month   - Recommended to hold PPI for now in case as she will likely have w/u for H. Pylori   - Avoiding NSAIDs  - Iron panel     Primary hypertension  Well controled.   - Continue losartan and chlorthalidone  - DASH diet      Depression  Long-time diagnosis. Symptoms significantly improving with bupropion.   - Continue Wellbutrin same dose  - Counseled on meditation-breathing exercises for relaxation (handout)     Prediabetes  A1c stable 6.1-6.2%. Patient has good insight, is participative and committed with lifestyle changes.   - Diabetic diet  - Counseled on 30 min activity 5 times a week  - Not willing to see a nutritionist for now  - A1c in 3 mo      Alopecia  Resolved. She is taking B12 vitamin. Hormonal studies compatible with post-menopausal status.     Dyslipidemia  Controlled. Lipid panel at goal. Patient working on improving diet and exercise.     History of cervical cancer  Vulvar polyp   Lesions described. History of conization but no other treatments.   Given this history and complexity of cervix anatomy citology was deferred and overalll patient needs surveillance with gynecology.     Return in about 5 weeks (around 2/13/2023).    Katie COUCH PGY1  Internal Medicine [facility]    Pt has been seen and discussed with the Attending Physician

## 2023-02-08 DIAGNOSIS — F41.9 ANXIETY: ICD-10-CM

## 2023-02-08 NOTE — TELEPHONE ENCOUNTER
Received request via: {REFILL PATIENT/PHARMACY:[id number]}    Was the patient seen in the last year in this department? {YES / NO:53986}    Does the patient have an active prescription (recently filled or refills available) for medication(s) requested? {Yes / No:05801}    Does the patient have [facility] Plus and need 100 day supply (blood pressure, diabetes and cholesterol meds only)? {YES/NO/NA:71151}

## 2023-03-08 ENCOUNTER — APPOINTMENT (OUTPATIENT)
Dept: URGENT CARE | Facility: PHYSICIAN GROUP | Age: 68
End: 2023-03-08
Payer: MEDICARE

## 2023-03-13 ENCOUNTER — OFFICE VISIT (OUTPATIENT)
Dept: INTERNAL MEDICINE | Facility: OTHER | Age: 68
End: 2023-03-13
Payer: MEDICARE

## 2023-03-13 VITALS
TEMPERATURE: 98.1 F | BODY MASS INDEX: 22.82 KG/M2 | OXYGEN SATURATION: 96 % | HEART RATE: 80 BPM | SYSTOLIC BLOOD PRESSURE: 106 MMHG | HEIGHT: 62 IN | DIASTOLIC BLOOD PRESSURE: 60 MMHG | WEIGHT: 124 LBS

## 2023-03-13 DIAGNOSIS — R42 DIZZINESS: ICD-10-CM

## 2023-03-13 DIAGNOSIS — M54.2 NECK PAIN: ICD-10-CM

## 2023-03-13 DIAGNOSIS — M54.50 ACUTE RIGHT-SIDED LOW BACK PAIN, UNSPECIFIED WHETHER SCIATICA PRESENT: ICD-10-CM

## 2023-03-13 PROCEDURE — 99214 OFFICE O/P EST MOD 30 MIN: CPT | Mod: GC

## 2023-03-13 RX ORDER — LIDOCAINE 50 MG/G
PATCH TOPICAL
COMMUNITY
Start: 2023-02-11 | End: 2023-03-13 | Stop reason: SDUPTHER

## 2023-03-13 RX ORDER — FLUTICASONE PROPIONATE 50 MCG
1 SPRAY, SUSPENSION (ML) NASAL DAILY
Qty: 16 G | Refills: 1 | Status: SHIPPED | OUTPATIENT
Start: 2023-03-13 | End: 2023-05-08

## 2023-03-13 RX ORDER — TIZANIDINE 2 MG/1
2 TABLET ORAL EVERY 8 HOURS PRN
Qty: 60 TABLET | Refills: 0 | Status: SHIPPED | OUTPATIENT
Start: 2023-03-13 | End: 2023-08-28

## 2023-03-13 RX ORDER — SUMATRIPTAN 50 MG/1
50 TABLET, FILM COATED ORAL
COMMUNITY
Start: 2023-01-25 | End: 2023-08-28 | Stop reason: SDUPTHER

## 2023-03-13 RX ORDER — LIDOCAINE 50 MG/G
PATCH TOPICAL
Qty: 15 PATCH | Refills: 1 | Status: SHIPPED | OUTPATIENT
Start: 2023-03-13

## 2023-03-13 RX ORDER — OMEPRAZOLE 40 MG/1
CAPSULE, DELAYED RELEASE ORAL
COMMUNITY
Start: 2023-02-20 | End: 2023-08-28 | Stop reason: SDUPTHER

## 2023-03-13 NOTE — PATIENT INSTRUCTIONS
Pursue workman's compensation    Prescribed tizanidine and flonase  Continue using tylenol, lidocaine patch, diclofenac gel    SURGEON:

Tana Raymond 

Tana Raymond MD

 

ASSISTANT:

ADALBERTO Mayer.

 

 

 

CHACHA THOMPSON

DD:  07/10/2020 14:37:48

DT:  07/10/2020 15:08:39

Job #:  [id number]

## 2023-03-13 NOTE — PROGRESS NOTES
Date of Service:  3/13/23    CC: urgent care followup    HPI:  Angely Nassar is a 67 y.o. female with a PMHx of HTN, dyslipidemia, migraines, prediabetes, major depressive disorder with SOM and osteoarthritis, here for urgent care followup.   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 States she initially felt fine, but 3-4 days later started to have neck and back pain, worsening with time. She was seen at [facility] about more than 1 month ago since she woke up with spinning sensation which caused her to fall, and neck pain. She was given lidocaine patch and diclofenac gel which she states did not help with her symptoms. She was seen again 4 days ago at urgent care requesting for alternative pain medication, but was told to check with PCP to see if have ?kidney problem? first. Currently taking 1000mg tylenol BID. She has been feeling weak, poor appetite, and stopped taking her buproprion 3 days ago as she did not feel like eating. Denies any nausea, vomiting. Does have sensation of dizziness at this time especially with extraocular eye movement test, associated with clicking noise in her ears. Denies any hearing loss. Does smoke about 2 cigarettes a day. Pain to right rhomboid area and right paraspinal muscles, denies any dysuria, urinary changes, fever, or chills.     Review of systems:  Review of Systems   Constitutional:  Positive for malaise/fatigue. Negative for chills and fever.   HENT:  Negative for ear pain, hearing loss and sore throat.    Eyes: Negative.    Respiratory:  Negative for cough and shortness of breath.    Cardiovascular:  Negative for chest pain, palpitations and leg swelling.   Gastrointestinal:  Negative for abdominal pain, constipation, diarrhea, nausea and vomiting.   Genitourinary:  Negative for dysuria and hematuria.   Musculoskeletal:  Positive for back pain and  neck pain.   Skin:  Negative for itching and rash.   Neurological:  Positive for dizziness. Negative for tingling, sensory change, speech change, focal weakness, loss of consciousness, weakness and headaches.      Past Medical History:  Patient Active Problem List    Diagnosis Date Noted    Tension type headache 01/09/2023    Vulvar polyp 01/09/2023    History of cervical cancer 01/09/2023    Dermatitis 02/26/2021    Dyslipidemia 02/27/2020    Prediabetes 02/27/2020    Restless legs 01/24/2020    Migraine 10/28/2014    Primary hypertension 10/07/2013    OA (osteoarthritis) 05/30/2013    Epigastric abdominal pain 05/30/2013    Depression 05/30/2013    Anxiety 05/30/2013       Past Surgical History:    has a past surgical history that includes cholecystectomy (2001).    Medications:  Current Outpatient Medications   Medication Sig Dispense Refill    omeprazole (PRILOSEC) 40 MG delayed-release capsule TAKE 1 CAPSULE BY MOUTH ONCE A DAY 30 MINUTES BEFORE BREAKFAST MEAL      SUMAtriptan (IMITREX) 50 MG Tab Take 50 mg by mouth one time as needed.      fluticasone (FLONASE) 50 MCG/ACT nasal spray Administer 1 Spray into affected nostril(S) every day. 16 g 1    tizanidine (ZANAFLEX) 2 MG tablet Take 1 Tablet by mouth every 8 hours as needed (pain/muscle spasm). 60 Tablet 0    diclofenac sodium (VOLTAREN) 1 % Gel APPLY 2G TOPICAL EVERY 6 TO 8 HOURS FOR 14 DAYS AS NEEDED FOR PAIN 50 g 1    lidocaine (LIDODERM) 5 % Patch APPLY 1 PATCH TOPICALLY EVERY DAY. REMOVE PATCHES AFTER 12 HRS 15 Patch 1    acetaminophen (TYLENOL) 500 MG Tab Take 2 Tablets by mouth 2 times a day as needed for Moderate Pain. 100 Tablet 0    hydrOXYzine HCl (ATARAX) 25 MG Tab Take 1 Tablet by mouth 3 times a day as needed for Itching. 100 Tablet 0    atorvastatin (LIPITOR) 20 MG Tab Take 1 Tablet by mouth every evening. 180 Tablet 0    chlorthalidone (HYGROTON) 25 MG Tab Take 0.5 Tablets by mouth every day for 180 days. 90 Tablet 0    losartan (COZAAR) 50  MG Tab Take 1 Tablet by mouth every day for 180 days. 180 Tablet 0    buPROPion SR (WELLBUTRIN-SR) 150 MG TABLET SR 12 HR sustained-release tablet Take 1 Tablet by mouth every day for 180 days. (Patient not taking: Reported on 3/13/2023) 180 Tablet 0    omeprazole (PRILOSEC) 20 MG delayed-release capsule Take 1 Capsule by mouth every day for 100 days. (Patient not taking: Reported on 3/13/2023) 100 Capsule 0     No current facility-administered medications for this visit.       Allergies:  Allergies   Allergen Reactions    Acetaminophen-Codeine Anaphylaxis    Tylenol W/Codeine [Acetaminophen-Codeine] Anaphylaxis    Lisinopril      Dryness, watery eyes, cough, difficulty breathing       Family History:   family history includes Cancer in her mother.     Social History:    Social History     Tobacco Use    Smoking status: Every Day     Packs/day: 0.30     Years: 3.00     Pack years: 0.90     Types: Cigarettes    Smokeless tobacco: Never    Tobacco comments:     3 cigarettes per day    Vaping Use    Vaping Use: Never used   Substance Use Topics    Alcohol use: No    Drug use: No     Physical Exam:  Vitals:    03/13/23 1256   BP: 106/60   Pulse: 80   Temp: 36.7 °C (98.1 °F)   SpO2: 96%     Body mass index is 22.68 kg/m².  Physical Exam  Constitutional:       General: She is not in acute distress.     Appearance: Normal appearance.   HENT:      Head: Normocephalic and atraumatic.      Right Ear: Tympanic membrane, ear canal and external ear normal.      Left Ear: Tympanic membrane, ear canal and external ear normal.      Nose: Nose normal.   Eyes:      General: No scleral icterus.     Extraocular Movements: Extraocular movements intact.      Conjunctiva/sclera: Conjunctivae normal.      Pupils: Pupils are equal, round, and reactive to light.      Comments: + horizontal nystagmus in both directions especially when looking to left.   Cardiovascular:      Rate and Rhythm: Normal rate and regular rhythm.      Heart sounds:  Normal heart sounds. No murmur heard.  Pulmonary:      Effort: No respiratory distress.      Breath sounds: Normal breath sounds.   Abdominal:      General: There is no distension.      Palpations: Abdomen is soft.   Musculoskeletal:         General: Tenderness (right rhomboid region and right paraspinal lumbar) present. No swelling.      Cervical back: Normal range of motion. Tenderness present. No rigidity.      Right lower leg: No edema.      Left lower leg: No edema.   Skin:     General: Skin is warm and dry.   Neurological:      General: No focal deficit present.      Mental Status: She is alert and oriented to person, place, and time.      Cranial Nerves: No cranial nerve deficit.      Sensory: No sensory deficit.      Motor: No weakness.      Gait: Gait normal.   Psychiatric:         Mood and Affect: Mood normal.         Behavior: Behavior normal.      Assessment/Plan:  1. Dizziness  - mild horizontal nystagmus noted in both directions (L>R) on exam.   - possible BPPV, given handout on epley maneuver.   - associated with clicking noise heard in ear, TM appear mildly retracted. Concerns for eustachian tube dysfunction  - fluticasone (FLONASE) 50 MCG/ACT nasal spray; Administer 1 Spray into affected nostril(S) every day.  Dispense: 16 g; Refill: 1  - no focal motor or sensory deficits. Gait wnl.   - Referral to Physical Therapy    2. Neck pain  S/p fall  - Referral to Physical Therapy  - tizanidine (ZANAFLEX) 2 MG tablet; Take 1 Tablet by mouth every 8 hours as needed (pain/muscle spasm).  Dispense: 60 Tablet; Refill: 0  - diclofenac sodium (VOLTAREN) 1 % Gel; APPLY 2G TOPICAL EVERY 6 TO 8 HOURS FOR 14 DAYS AS NEEDED FOR PAIN  Dispense: 50 g; Refill: 1  - lidocaine (LIDODERM) 5 % Patch; APPLY 1 PATCH TOPICALLY EVERY DAY. REMOVE PATCHES AFTER 12 HRS  Dispense: 15 Patch; Refill: 1    3. Acute right-sided low back pain, unspecified whether sciatica present  Tender to palpation on exam, likely muscle strain s/p  fall. No incontinence, sensory/motor deficits/weakness.  - Referral to Physical Therapy  - tizanidine (ZANAFLEX) 2 MG tablet; Take 1 Tablet by mouth every 8 hours as needed (pain/muscle spasm).  Dispense: 60 Tablet; Refill: 0  - diclofenac sodium (VOLTAREN) 1 % Gel; APPLY 2G TOPICAL EVERY 6 TO 8 HOURS FOR 14 DAYS AS NEEDED FOR PAIN  Dispense: 50 g; Refill: 1  - lidocaine (LIDODERM) 5 % Patch; APPLY 1 PATCH TOPICALLY EVERY DAY. REMOVE PATCHES AFTER 12 HRS  Dispense: 15 Patch; Refill: 1    Other orders  - omeprazole (PRILOSEC) 40 MG delayed-release capsule; TAKE 1 CAPSULE BY MOUTH ONCE A DAY 30 MINUTES BEFORE BREAKFAST MEAL  - SUMAtriptan (IMITREX) 50 MG Tab; Take 50 mg by mouth one time as needed.    All imaging results and lab results and consult notes are reviewed at this visit.  Followup: Return in about 1 month (around 4/13/2023) for Dr. Cotter .    Mercedes Wisdom, DO  Internal Medicine PGY-2

## 2023-03-14 ASSESSMENT — ENCOUNTER SYMPTOMS
CHILLS: 0
FEVER: 0
PALPITATIONS: 0
SPEECH CHANGE: 0
TINGLING: 0
DIARRHEA: 0
SENSORY CHANGE: 0
HEADACHES: 0
FOCAL WEAKNESS: 0
SORE THROAT: 0
DIZZINESS: 1
BACK PAIN: 1
LOSS OF CONSCIOUSNESS: 0
NAUSEA: 0
ABDOMINAL PAIN: 0
CONSTIPATION: 0
WEAKNESS: 0
SHORTNESS OF BREATH: 0
NECK PAIN: 1
VOMITING: 0
COUGH: 0
EYES NEGATIVE: 1

## 2023-05-04 DIAGNOSIS — R42 DIZZINESS: ICD-10-CM

## 2023-05-08 RX ORDER — FLUTICASONE PROPIONATE 50 MCG
1 SPRAY, SUSPENSION (ML) NASAL DAILY
Qty: 16 ML | Refills: 1 | Status: SHIPPED | OUTPATIENT
Start: 2023-05-08 | End: 2024-02-05 | Stop reason: SDUPTHER

## 2023-06-20 RX ORDER — ATORVASTATIN CALCIUM 20 MG/1
TABLET, FILM COATED ORAL
Qty: 100 TABLET | Refills: 1 | Status: SHIPPED | OUTPATIENT
Start: 2023-06-20 | End: 2023-08-28 | Stop reason: SDUPTHER

## 2023-08-27 NOTE — PROGRESS NOTES
Chief Complaint   Patient presents with    Pain     Pain in right hand and wrist. Pain travels up to right arm and up to right side of neck.     Medication Refill     6 months of all medications refilled.    New Med Request     Wellbutrin - was previously taking it   Losartan, chlorthalidone        HPI: Angely Nassar is a 67 y.o. female with past medical history as below who presented to the clinic for the following.    *Pain on right upper extremity   From tip of fingers - 1st 2 fingers on dorsal side  all the way up to neck, associated swelling on the first 2 fingers, tingling and numbness present as well.   Patient denies any neck issues, when she moves her neck there is slight strain on the lateral neck however this pain does not radiate to her fingers however when she moves her fingers the pain radiates all the way up to neck and patient is very certain that the pain is not coming from the neck and it is coming from the fingertips.  She denies any issues in her elbow.  She does have some pain in the anterior right shoulder with movement- abduction more than flexion. Has limited internal rotation however most of these maneuvers are producing pain at fingers more than shoulder or neck.    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 She was injured on 19th of May and was instructed to go to workers comp doctor -  however this happened late in the shift and  she was not brenda to be seen the same day . She went on Monday and was seen by doctor who prescribed the wrist brace and provided a device for heat and cold. These interventions helped however when she stopped using the cold - the pain returned.  When asked if she is still under workers comp - patient provided a long answer in Russian interpreted by interpretor .Her department  closed down due to  unclear reasons and hence she has not had to go back to work, she had a subsequent appointment when she was told that the Worker's Comp. would not go through and that she had to appeal for it, and she has another appointment pending for appeal on 12 September. She was told by [place]  for injured workers to present to PCP in the meantime.     Patient is currently not using any medications for these symptoms.   Still has the brace.     Other pertinent history   Seen last for   Low back pain and neck pain in march   -physical therapy and tizanidine ordered   Patient reports that the pain at the time was near scapula and different from pain experienced at this time.     *Hypertension   On chlorthalidone 12.5 mg and losartan 50 mg as per patient - asking for 6 month refill as her insurance is changing   *DLD   -Is on atorvastatin, tolerating well    *Prediabetes   -Last a1c of 6.2% from October 2022   *OA   *Epigastric abdominal pain   -On omeprazole   H pylori antibody positive in 2012   EGD in 2013 with possible Camejo's esophagus and gastritis, no biopsies in files  Following up with GI   *Depression   *Anxiety   On hydroxyzine   Doing well on Wellbutrin which patient is still taking    Other problems not discussed on this visit      *Migraine   Sumatriptan   *Tension type headache   *Vulvar polyp   *History of cervical cancer   -following up with gynecology?  *Restless leg   *Dermatitis  *Dizziness   Epley          Patient Active Problem List    Diagnosis Date Noted    Tension type headache 01/09/2023    Vulvar polyp 01/09/2023    History of cervical cancer 01/09/2023    Dermatitis 02/26/2021    Dyslipidemia 02/27/2020    Prediabetes 02/27/2020    Restless legs 01/24/2020    Migraine 10/28/2014    Primary hypertension 10/07/2013    OA (osteoarthritis) 05/30/2013    Epigastric abdominal pain 05/30/2013    Depression 05/30/2013    Anxiety 05/30/2013       Current Outpatient Medications  "  Medication Sig Dispense Refill    omeprazole (PRILOSEC) 40 MG delayed-release capsule TAKE 1 CAPSULE BY MOUTH ONCE A DAY 30 MINUTES BEFORE BREAKFAST MEAL 100 Capsule 1    SUMAtriptan (IMITREX) 50 MG Tab Take 1 Tablet by mouth one time as needed for Migraine. 10 Tablet 6    buPROPion SR (WELLBUTRIN-SR) 150 MG TABLET SR 12 HR sustained-release tablet Take 1 Tablet by mouth every day. 180 Tablet 1    losartan (COZAAR) 50 MG Tab Take 1 Tablet by mouth every day. 180 Tablet 1    chlorthalidone (HYGROTON) 25 MG Tab Take 0.5 Tablets by mouth every day. 90 Tablet 1    atorvastatin (LIPITOR) 20 MG Tab Take 1 Tablet by mouth every evening. 180 Tablet 1    gabapentin (NEURONTIN) 100 MG Cap Take 1 Capsule by mouth 3 times a day. 30 Capsule 2    fluticasone (FLONASE) 50 MCG/ACT nasal spray ADMINISTER 1 SPRAY INTO AFFECTED NOSTRIL(S) EVERY DAY. 16 mL 1    diclofenac sodium (VOLTAREN) 1 % Gel APPLY 2G TOPICAL EVERY 6 TO 8 HOURS FOR 14 DAYS AS NEEDED FOR PAIN 50 g 1    lidocaine (LIDODERM) 5 % Patch APPLY 1 PATCH TOPICALLY EVERY DAY. REMOVE PATCHES AFTER 12 HRS 15 Patch 1    hydrOXYzine HCl (ATARAX) 25 MG Tab TAKE 1 TABLET BY MOUTH 3 TIMES A DAY AS NEEDED FOR ITCHING 100 Tablet 0    acetaminophen (TYLENOL) 500 MG Tab Take 2 Tablets by mouth 2 times a day as needed for Moderate Pain. (Patient not taking: Reported on 8/28/2023) 100 Tablet 0     No current facility-administered medications for this visit.       ROS: As per HPI. Additional pertinent systems as noted below.  Constitutional:  Negative for fever, chills   Cardiovascular:  Negative for chest pain, palpitations   Respiratory:  Negative for cough, sputum production, shortness of breath   Neurologic: positive as in hpi  MSK ; positive as in hpi    /72 (BP Location: Left arm, Patient Position: Sitting, BP Cuff Size: Adult)   Pulse 73   Temp 36.4 °C (97.6 °F) (Temporal)   Ht 1.575 m (5' 2\")   Wt 55.7 kg (122 lb 12.8 oz)   LMP 12/01/2006   SpO2 98%   BMI 22.46 " kg/m²     Physical Exam   Constitutional:  Well developed, well nourished. Not in acute distress   Neck:  lateral flexion to left eliciting pain on lateral neck  Shoulder :  have some pain in the anterior right shoulder with movement- abduction more than flexion. Has limited internal rotation however most of these maneuvers are producing pain at fingers more than shoulder or neck.    Decreased ROM of 1st and 2nd, with swelling - no signs of infection, no cuts wounds, no wrist drop appreciated   Unable to perform strength exam due to pain .   Full strength on contralateral hand     Note: I have reviewed all pertinent labs and diagnostic tests associated with this visit with specific comments listed under the assessment and plan below    Assessment and Plan    1. Neuropathy    Through shared decision making decided with patient to wait until we hear from Worker's Compensation before pursuing further testing and referrals.   Prescribing gabapentin for symptomatic relief at this time.  Ducted patient to try taking it at nighttime first and to take it during the day as well if tolerating well at night.    - gabapentin (NEURONTIN) 100 MG Cap; Take 1 Capsule by mouth 3 times a day.  Dispense: 30 Capsule; Refill: 2    2. Dyspepsia  Well controlled.  - omeprazole (PRILOSEC) 40 MG delayed-release capsule; TAKE 1 CAPSULE BY MOUTH ONCE A DAY 30 MINUTES BEFORE BREAKFAST MEAL  Dispense: 100 Capsule; Refill: 1    3. Primary hypertension  Well controlled on meds below- refill placed   - losartan (COZAAR) 50 MG Tab; Take 1 Tablet by mouth every day.  Dispense: 180 Tablet; Refill: 1  - chlorthalidone (HYGROTON) 25 MG Tab; Take 0.5 Tablets by mouth every day.  Dispense: 90 Tablet; Refill: 1    4. Anxiety  Well controlled - also with as needed hydroxyzine  - buPROPion SR (WELLBUTRIN-SR) 150 MG TABLET SR 12 HR sustained-release tablet; Take 1 Tablet by mouth every day.  Dispense: 180 Tablet; Refill: 1    5. Current severe episode of  major depressive disorder without psychotic features without prior episode (HCC)  Well controlled on current meds   - buPROPion SR (WELLBUTRIN-SR) 150 MG TABLET SR 12 HR sustained-release tablet; Take 1 Tablet by mouth every day.  Dispense: 180 Tablet; Refill: 1    6. Dyslipidemia  Refill placed - no side effect , due repeat lipid panel- defer to PCP for next visit   - atorvastatin (LIPITOR) 20 MG Tab; Take 1 Tablet by mouth every evening.  Dispense: 180 Tablet; Refill: 1    7. Migraine without status migrainosus, not intractable, unspecified migraine type  Not discussed in detail on this visit - refill placed - will arrange for follow up to discuss this in detail   - SUMAtriptan (IMITREX) 50 MG Tab; Take 1 Tablet by mouth one time as needed for Migraine.  Dispense: 10 Tablet; Refill: 6     Followup: Return in about 1 month (around 9/28/2023).  Patient tells me that she will call to set up follow-up with PCP when she hears back from Worker's Comp.        Signed by: Dinesh Thakkar M.D.

## 2023-08-28 ENCOUNTER — OFFICE VISIT (OUTPATIENT)
Dept: INTERNAL MEDICINE | Facility: OTHER | Age: 68
End: 2023-08-28
Payer: MEDICARE

## 2023-08-28 VITALS
HEIGHT: 62 IN | OXYGEN SATURATION: 98 % | SYSTOLIC BLOOD PRESSURE: 113 MMHG | HEART RATE: 73 BPM | DIASTOLIC BLOOD PRESSURE: 72 MMHG | BODY MASS INDEX: 22.6 KG/M2 | TEMPERATURE: 97.6 F | WEIGHT: 122.8 LBS

## 2023-08-28 DIAGNOSIS — G43.909 MIGRAINE WITHOUT STATUS MIGRAINOSUS, NOT INTRACTABLE, UNSPECIFIED MIGRAINE TYPE: ICD-10-CM

## 2023-08-28 DIAGNOSIS — G62.9 NEUROPATHY: ICD-10-CM

## 2023-08-28 DIAGNOSIS — F32.2 CURRENT SEVERE EPISODE OF MAJOR DEPRESSIVE DISORDER WITHOUT PSYCHOTIC FEATURES WITHOUT PRIOR EPISODE (HCC): ICD-10-CM

## 2023-08-28 DIAGNOSIS — I10 PRIMARY HYPERTENSION: ICD-10-CM

## 2023-08-28 DIAGNOSIS — E78.5 DYSLIPIDEMIA: ICD-10-CM

## 2023-08-28 DIAGNOSIS — F41.9 ANXIETY: ICD-10-CM

## 2023-08-28 DIAGNOSIS — R10.13 DYSPEPSIA: ICD-10-CM

## 2023-08-28 PROCEDURE — 3074F SYST BP LT 130 MM HG: CPT | Performed by: INTERNAL MEDICINE

## 2023-08-28 PROCEDURE — 99214 OFFICE O/P EST MOD 30 MIN: CPT | Performed by: INTERNAL MEDICINE

## 2023-08-28 PROCEDURE — 3078F DIAST BP <80 MM HG: CPT | Performed by: INTERNAL MEDICINE

## 2023-08-28 RX ORDER — LOSARTAN POTASSIUM 50 MG/1
50 TABLET ORAL DAILY
Qty: 180 TABLET | Refills: 1 | Status: SHIPPED | OUTPATIENT
Start: 2023-08-28

## 2023-08-28 RX ORDER — OMEPRAZOLE 40 MG/1
CAPSULE, DELAYED RELEASE ORAL
Qty: 100 CAPSULE | Refills: 1 | Status: SHIPPED | OUTPATIENT
Start: 2023-08-28

## 2023-08-28 RX ORDER — GABAPENTIN 100 MG/1
100 CAPSULE ORAL 3 TIMES DAILY
Qty: 30 CAPSULE | Refills: 2 | Status: SHIPPED | OUTPATIENT
Start: 2023-08-28 | End: 2023-12-22 | Stop reason: SDUPTHER

## 2023-08-28 RX ORDER — CHLORTHALIDONE 25 MG/1
12.5 TABLET ORAL DAILY
Qty: 90 TABLET | Refills: 1 | Status: SHIPPED | OUTPATIENT
Start: 2023-08-28

## 2023-08-28 RX ORDER — ATORVASTATIN CALCIUM 20 MG/1
20 TABLET, FILM COATED ORAL EVERY EVENING
Qty: 100 TABLET | Refills: 1 | Status: SHIPPED | OUTPATIENT
Start: 2023-08-28 | End: 2023-08-28

## 2023-08-28 RX ORDER — LOSARTAN POTASSIUM 50 MG/1
50 TABLET ORAL DAILY
Qty: 90 TABLET | Refills: 1 | Status: SHIPPED | OUTPATIENT
Start: 2023-08-28 | End: 2023-08-28

## 2023-08-28 RX ORDER — CHLORTHALIDONE 25 MG/1
12.5 TABLET ORAL DAILY
Qty: 45 TABLET | Refills: 1 | Status: SHIPPED | OUTPATIENT
Start: 2023-08-28 | End: 2023-08-28

## 2023-08-28 RX ORDER — BUPROPION HYDROCHLORIDE 150 MG/1
150 TABLET, EXTENDED RELEASE ORAL DAILY
Qty: 180 TABLET | Refills: 1 | Status: SHIPPED | OUTPATIENT
Start: 2023-08-28

## 2023-08-28 RX ORDER — SUMATRIPTAN 50 MG/1
50 TABLET, FILM COATED ORAL
Qty: 10 TABLET | Refills: 6 | Status: SHIPPED | OUTPATIENT
Start: 2023-08-28

## 2023-08-28 RX ORDER — ATORVASTATIN CALCIUM 20 MG/1
20 TABLET, FILM COATED ORAL EVERY EVENING
Qty: 180 TABLET | Refills: 1 | Status: SHIPPED | OUTPATIENT
Start: 2023-08-28

## 2023-08-29 RX ORDER — HYDROXYZINE HYDROCHLORIDE 25 MG/1
25 TABLET, FILM COATED ORAL 3 TIMES DAILY PRN
Qty: 100 TABLET | Refills: 0 | Status: SHIPPED | OUTPATIENT
Start: 2023-08-29 | End: 2023-10-05 | Stop reason: SDUPTHER

## 2023-08-30 NOTE — TELEPHONE ENCOUNTER
During our visit on 08/28, patient requested for refill of all her meds including hydroxyzine.   When attempting to place refill for hydroxyzine, noticed that there was already an open encounter for this refill which is this encounter.   Hence refill was placed through this encounter instead of the visit in order to close the encounter. Unclear who the encounter was for.

## 2023-08-31 ENCOUNTER — TELEPHONE (OUTPATIENT)
Dept: INTERNAL MEDICINE | Facility: OTHER | Age: 68
End: 2023-08-31
Payer: MEDICARE

## 2023-08-31 NOTE — LETTER
Deascot Au,  Below is your medication list that was updated at your last visit with Dr. Thakkar on 08/28/2023.           Current Outpatient Medications:     hydrOXYzine HCl (ATARAX) 25 MG Tab, TAKE 1 TABLET BY MOUTH 3 TIMES A DAY AS NEEDED FOR ITCHING, Disp: 100 Tablet, Rfl: 0    omeprazole (PRILOSEC) 40 MG delayed-release capsule, TAKE 1 CAPSULE BY MOUTH ONCE A DAY 30 MINUTES BEFORE BREAKFAST MEAL, Disp: 100 Capsule, Rfl: 1    SUMAtriptan (IMITREX) 50 MG Tab, Take 1 Tablet by mouth one time as needed for Migraine., Disp: 10 Tablet, Rfl: 6    buPROPion SR (WELLBUTRIN-SR) 150 MG TABLET SR 12 HR sustained-release tablet, Take 1 Tablet by mouth every day., Disp: 180 Tablet, Rfl: 1    losartan (COZAAR) 50 MG Tab, Take 1 Tablet by mouth every day., Disp: 180 Tablet, Rfl: 1    chlorthalidone (HYGROTON) 25 MG Tab, Take 0.5 Tablets by mouth every day., Disp: 90 Tablet, Rfl: 1    atorvastatin (LIPITOR) 20 MG Tab, Take 1 Tablet by mouth every evening., Disp: 180 Tablet, Rfl: 1    gabapentin (NEURONTIN) 100 MG Cap, Take 1 Capsule by mouth 3 times a day., Disp: 30 Capsule, Rfl: 2    fluticasone (FLONASE) 50 MCG/ACT nasal spray, ADMINISTER 1 SPRAY INTO AFFECTED NOSTRIL(S) EVERY DAY., Disp: 16 mL, Rfl: 1    diclofenac sodium (VOLTAREN) 1 % Gel, APPLY 2G TOPICAL EVERY 6 TO 8 HOURS FOR 14 DAYS AS NEEDED FOR PAIN, Disp: 50 g, Rfl: 1    lidocaine (LIDODERM) 5 % Patch, APPLY 1 PATCH TOPICALLY EVERY DAY. REMOVE PATCHES AFTER 12 HRS, Disp: 15 Patch, Rfl: 1    acetaminophen (TYLENOL) 500 MG Tab, Take 2 Tablets by mouth 2 times a day as needed for Moderate Pain. (Patient not taking: Reported on 8/28/2023), Disp: 100 Tablet, Rfl: 0

## 2023-08-31 NOTE — TELEPHONE ENCOUNTER
VOICEMAIL  1. Caller Name: Angely Nassar                        Call Back Number: [phone]    2. Message: Patient left a voicemail stating she takes Gabapentin, wellbutrin, and losartan and would like to know if it is safe to take gabapentin with these medications or if there is any contraindications.    3. Patient approves office to leave a detailed voicemail/MyChart message: yes

## 2023-09-01 NOTE — TELEPHONE ENCOUNTER
Please let patient know that it is safe to take them together.   If having any side effects of each drug separately, then she can reach out.

## 2023-09-01 NOTE — TELEPHONE ENCOUNTER
Phone Number Called: [phone]    Call outcome: Spoke to patient regarding message below.    Message: spoke with the patient and informed her about the medication advise from Dr. Thakkar, pt verbalized understanding.    Pt also stated that the pharmacy did not have the prescriptions for Chlorthalidone, Losartan, and Wellbutrin, I informed the pt that on our side it showed that they were sent on 08/28/23 but that I would call the pharmacy once they open to check the status.

## 2023-09-01 NOTE — TELEPHONE ENCOUNTER
Phone Number Called: [phone] [facility]    Call outcome:  spoke with pharmacist    Message: called and spoke with pharmacist at [facility] and she stated that the patient picked up the Rx on 08/30/2023.    CALLED PT    Phone Number Called: [phone]    Call outcome: Did not leave a detailed message. Requested patient to call back.    Message: lm for pt to call back

## 2023-09-06 NOTE — TELEPHONE ENCOUNTER
Phone Number Called: [phone]    Call outcome: Did not leave a detailed message. Requested patient to call back.    Message: Left vm for patient to call back

## 2023-09-06 NOTE — TELEPHONE ENCOUNTER
Phone Number Called: [phone]    Call outcome: Spoke to patient regarding message below.    Message: Spoke with patient and let her know that we called [facility] and were informed by the pharmacist that she had picked up the medication on 08/30/2023. Patient states she knows she has not picked up the medication because it is not listed on her AVS from the last visit. I tried to explain to the patient that that was an error and it may have been that Dr. Thakkar was not done with the visit note when the AVS was printed and that is why it was not updated. Patient then went on to list all her medications to which I informed her that we do have the medications she listed on file as well as the Chlorthalidone, Losartan, and Wellbutrin that she was asking about. Patient then requested that we send her a list of her medications by mail. I verified the patient's mailing address on file and informed her that I will send her the medication list by mail.

## 2023-10-03 DIAGNOSIS — F41.9 ANXIETY: ICD-10-CM

## 2023-10-05 RX ORDER — HYDROXYZINE HYDROCHLORIDE 25 MG/1
25 TABLET, FILM COATED ORAL 3 TIMES DAILY PRN
Qty: 100 TABLET | Refills: 0 | Status: SHIPPED | OUTPATIENT
Start: 2023-10-05 | End: 2024-02-12 | Stop reason: SDUPTHER

## 2023-10-11 ENCOUNTER — TELEPHONE (OUTPATIENT)
Dept: INTERNAL MEDICINE | Facility: OTHER | Age: 68
End: 2023-10-11
Payer: MEDICARE

## 2023-10-11 NOTE — TELEPHONE ENCOUNTER
VOICEMAIL  1. Caller Name: Angely Nassar       Call Back Number: [phone] (home)      2. Message: Patient would like copy of flu injection.     3. Patient approves office to leave a detailed voicemail/MyChart message: no

## 2023-12-21 DIAGNOSIS — G62.9 NEUROPATHY: ICD-10-CM

## 2023-12-22 RX ORDER — GABAPENTIN 100 MG/1
100 CAPSULE ORAL 3 TIMES DAILY
Qty: 60 CAPSULE | Refills: 0 | Status: SHIPPED | OUTPATIENT
Start: 2023-12-22

## 2024-02-05 DIAGNOSIS — R42 DIZZINESS: ICD-10-CM

## 2024-02-05 RX ORDER — FLUTICASONE PROPIONATE 50 MCG
1 SPRAY, SUSPENSION (ML) NASAL DAILY
Qty: 16 ML | Refills: 1 | Status: SHIPPED | OUTPATIENT
Start: 2024-02-05

## 2024-02-05 NOTE — TELEPHONE ENCOUNTER
Received request via: Pharmacy    Was the patient seen in the last year in this department? Yes    Does the patient have an active prescription (recently filled or refills available) for medication(s) requested? No    Ph[facility] Name: [facility]     Does the patient have detention Plus and need 100 day supply (blood pressure, diabetes and cholesterol meds only)? Patient does not have SCP

## 2024-02-11 DIAGNOSIS — F41.9 ANXIETY: ICD-10-CM

## 2024-02-12 RX ORDER — HYDROXYZINE HYDROCHLORIDE 25 MG/1
25 TABLET, FILM COATED ORAL 3 TIMES DAILY PRN
Qty: 50 TABLET | Refills: 0 | Status: SHIPPED | OUTPATIENT
Start: 2024-02-12

## 2024-02-12 NOTE — TELEPHONE ENCOUNTER
Received request via: Pharmacy    Was the patient seen in the last year in this department? Yes    Does the patient have an active prescription (recently filled or refills available) for medication(s) requested? No    Pharm[facility] Name: [id number] [place], [place] - [address] [facility]     Does the patient have penitentiary Plus and need 100 day supply (blood pressure, diabetes and cholesterol meds only)? Patient does not have SCP

## 2024-02-14 ENCOUNTER — TELEPHONE (OUTPATIENT)
Dept: INTERNAL MEDICINE | Facility: OTHER | Age: 69
End: 2024-02-14
Payer: MEDICARE

## 2024-02-14 NOTE — TELEPHONE ENCOUNTER
Katie Cotter M.D.  You23 minutes ago (11:02 AM)     ZAHEER crawford, she needs to make an appointment if she is requesting this medication.  Thanks

## 2024-02-14 NOTE — TELEPHONE ENCOUNTER
Incoming fax from t, Hydroxyzine HCL is not cover by insurance due to it not being on formulary list. Please send in alternative. Letter has been scanned in media.

## 2024-02-14 NOTE — TELEPHONE ENCOUNTER
Phone Number Called: [phone] (home)      Call outcome:  No VM set up    Message: Attempted to call to set up appt. Per Dr. Cotter. To discuss her medication Hydroxyzine.

## 2024-03-30 DIAGNOSIS — G62.9 NEUROPATHY: ICD-10-CM

## 2024-04-01 RX ORDER — GABAPENTIN 100 MG/1
100 CAPSULE ORAL 3 TIMES DAILY
Qty: 60 CAPSULE | Refills: 0 | Status: SHIPPED | OUTPATIENT
Start: 2024-04-01

## 2024-04-01 NOTE — TELEPHONE ENCOUNTER
Received request via: Pharmacy    Was the patient seen in the last year in this department? Yes    Does the patient have an active prescription (recently filled or refills available) for medication(s) requested? No    Pharmacy Name: [facility] #[id number] - [place], [place] - [address] [facility].     Does the patient have long-term Plus and need 100 day supply (blood pressure, diabetes and cholesterol meds only)? Patient does not have SCP

## 2024-05-21 DIAGNOSIS — R42 DIZZINESS: ICD-10-CM

## 2024-05-21 RX ORDER — FLUTICASONE PROPIONATE 50 MCG
1 SPRAY, SUSPENSION (ML) NASAL DAILY
Qty: 16 ML | Refills: 1 | Status: SHIPPED | OUTPATIENT
Start: 2024-05-21

## 2024-05-21 NOTE — TELEPHONE ENCOUNTER
Received request via: Pharmacy    Was the patient seen in the last year in this department? Yes    Does the patient have an active prescription (recently filled or refills available) for medication(s) requested? No    Pharmacy Name: [facility]    Does the patient have long term Plus and need 100 day supply (blood pressure, diabetes and cholesterol meds only)? Patient does not have SCP

## 2024-07-03 DIAGNOSIS — G62.9 NEUROPATHY: ICD-10-CM

## 2024-07-03 DIAGNOSIS — R10.13 DYSPEPSIA: ICD-10-CM

## 2024-07-03 DIAGNOSIS — F41.9 ANXIETY: ICD-10-CM

## 2024-07-03 RX ORDER — OMEPRAZOLE 40 MG/1
CAPSULE, DELAYED RELEASE ORAL
Qty: 100 CAPSULE | Refills: 1 | Status: SHIPPED | OUTPATIENT
Start: 2024-07-03

## 2024-07-03 RX ORDER — GABAPENTIN 100 MG/1
100 CAPSULE ORAL 3 TIMES DAILY
Qty: 60 CAPSULE | Refills: 0 | Status: SHIPPED | OUTPATIENT
Start: 2024-07-03

## 2024-07-03 RX ORDER — HYDROXYZINE HYDROCHLORIDE 25 MG/1
25 TABLET, FILM COATED ORAL 3 TIMES DAILY PRN
Qty: 50 TABLET | Refills: 0 | Status: SHIPPED | OUTPATIENT
Start: 2024-07-03

## 2024-08-15 ENCOUNTER — TELEPHONE (OUTPATIENT)
Dept: INTERNAL MEDICINE | Facility: OTHER | Age: 69
End: 2024-08-15
Payer: MEDICARE

## 2024-08-15 NOTE — TELEPHONE ENCOUNTER
FINAL PRIOR AUTHORIZATION STATUS:    1.  Name of Medication & Dose: hydrOXYzine HCl 25MG tablets     2. Prior Auth Status: Approved through 12/31/2024     3. Action Taken: Pharmacy Notified: N\A Patient Notified: N\A

## 2024-08-22 DIAGNOSIS — F41.9 ANXIETY: ICD-10-CM

## 2024-08-22 DIAGNOSIS — G62.9 NEUROPATHY: ICD-10-CM

## 2024-08-22 DIAGNOSIS — E78.5 DYSLIPIDEMIA: ICD-10-CM

## 2024-08-22 DIAGNOSIS — G43.909 MIGRAINE WITHOUT STATUS MIGRAINOSUS, NOT INTRACTABLE, UNSPECIFIED MIGRAINE TYPE: ICD-10-CM

## 2024-08-22 NOTE — TELEPHONE ENCOUNTER
Received request via: Pharmacy    Was the patient seen in the last year in this department? Yes    Does the patient have an active prescription (recently filled or refills available) for medication(s) requested? No    [facility]    Does the patient have snf Plus and need 100-day supply? (This applies to ALL medications) Patient does not have SCP

## 2024-08-22 NOTE — TELEPHONE ENCOUNTER
Received request via: Pharmacy    Was the patient seen in the last year in this department? Yes    Does the patient have an active prescription (recently filled or refills available) for medication(s) requested? No    [facility]    Does the patient have nursing home Plus and need 100-day supply? (This applies to ALL medications) Patient does not have SCP

## 2024-08-23 RX ORDER — HYDROXYZINE HYDROCHLORIDE 25 MG/1
25 TABLET, FILM COATED ORAL 3 TIMES DAILY PRN
Qty: 50 TABLET | Refills: 0 | Status: SHIPPED | OUTPATIENT
Start: 2024-08-23

## 2024-08-23 RX ORDER — ATORVASTATIN CALCIUM 20 MG/1
20 TABLET, FILM COATED ORAL EVERY EVENING
Qty: 100 TABLET | Refills: 3 | Status: SHIPPED | OUTPATIENT
Start: 2024-08-23

## 2024-08-23 RX ORDER — SUMATRIPTAN 50 MG/1
50 TABLET, FILM COATED ORAL
Qty: 9 TABLET | Refills: 7 | Status: SHIPPED | OUTPATIENT
Start: 2024-08-23

## 2024-08-23 RX ORDER — GABAPENTIN 100 MG/1
100 CAPSULE ORAL 3 TIMES DAILY
Qty: 60 CAPSULE | Refills: 0 | Status: SHIPPED | OUTPATIENT
Start: 2024-08-23

## 2024-09-05 DIAGNOSIS — R42 DIZZINESS: ICD-10-CM

## 2024-09-05 NOTE — TELEPHONE ENCOUNTER
Received request via: Pharmacy    Was the patient seen in the last year in this department? Yes    Does the patient have an active prescription (recently filled or refills available) for medication(s) requested? No    [facility]    Does the patient have [facility] Plus and need 100-day supply? (This applies to ALL medications) Patient does not have [facility]    Pharmacy requesting 90 day supply.

## 2024-09-06 RX ORDER — FLUTICASONE PROPIONATE 50 MCG
1 SPRAY, SUSPENSION (ML) NASAL DAILY
Qty: 16 ML | Refills: 1 | Status: SHIPPED | OUTPATIENT
Start: 2024-09-06

## 2024-09-15 DIAGNOSIS — I10 PRIMARY HYPERTENSION: ICD-10-CM

## 2024-09-16 RX ORDER — LOSARTAN POTASSIUM 50 MG/1
50 TABLET ORAL DAILY
Qty: 90 TABLET | Refills: 0 | Status: SHIPPED | OUTPATIENT
Start: 2024-09-16

## 2024-09-16 NOTE — TELEPHONE ENCOUNTER
Received request via: Pharmacy    Was the patient seen in the last year in this department? Yes    Does the patient have an active prescription (recently filled or refills available) for medication(s) requested? No    [facility]    Does the patient have detention Plus and need 100-day supply? (This applies to ALL medications) Patient does not have SCP

## 2024-09-17 NOTE — TELEPHONE ENCOUNTER
Spoke to patient, she does not know why Dr Cotter was contacted for med refill since she has a new primary care doctor. I took off Dr Cotter as her PCP.

## 2025-03-08 DIAGNOSIS — R42 DIZZINESS: ICD-10-CM

## 2025-03-10 RX ORDER — FLUTICASONE PROPIONATE 50 MCG
1 SPRAY, SUSPENSION (ML) NASAL DAILY
Qty: 16 ML | Refills: 1 | OUTPATIENT
Start: 2025-03-10

## 2025-08-08 ENCOUNTER — APPOINTMENT (OUTPATIENT)
Dept: LAB | Facility: MEDICAL CENTER | Age: 70
End: 2025-08-08
Attending: NURSE PRACTITIONER
Payer: MEDICARE